# Patient Record
Sex: FEMALE | Race: WHITE | NOT HISPANIC OR LATINO | Employment: FULL TIME | URBAN - METROPOLITAN AREA
[De-identification: names, ages, dates, MRNs, and addresses within clinical notes are randomized per-mention and may not be internally consistent; named-entity substitution may affect disease eponyms.]

---

## 2019-10-28 ENCOUNTER — OFFICE VISIT (OUTPATIENT)
Dept: OBGYN CLINIC | Age: 39
End: 2019-10-28
Payer: COMMERCIAL

## 2019-10-28 VITALS
DIASTOLIC BLOOD PRESSURE: 74 MMHG | SYSTOLIC BLOOD PRESSURE: 122 MMHG | HEIGHT: 64 IN | BODY MASS INDEX: 29.71 KG/M2 | HEART RATE: 72 BPM | WEIGHT: 174 LBS

## 2019-10-28 DIAGNOSIS — N80.9 ENDOMETRIOSIS: ICD-10-CM

## 2019-10-28 DIAGNOSIS — Z30.011 ENCOUNTER FOR INITIAL PRESCRIPTION OF CONTRACEPTIVE PILLS: ICD-10-CM

## 2019-10-28 DIAGNOSIS — Z01.419 ROUTINE GYNECOLOGICAL EXAMINATION: Primary | ICD-10-CM

## 2019-10-28 DIAGNOSIS — Z87.42 HISTORY OF CERVICAL INCOMPETENCE: ICD-10-CM

## 2019-10-28 DIAGNOSIS — A60.9 HSV (HERPES SIMPLEX VIRUS) ANOGENITAL INFECTION: ICD-10-CM

## 2019-10-28 PROCEDURE — S0610 ANNUAL GYNECOLOGICAL EXAMINA: HCPCS | Performed by: STUDENT IN AN ORGANIZED HEALTH CARE EDUCATION/TRAINING PROGRAM

## 2019-10-28 RX ORDER — VALACYCLOVIR HYDROCHLORIDE 500 MG/1
500 TABLET, FILM COATED ORAL 2 TIMES DAILY
Qty: 24 TABLET | Refills: 4 | Status: SHIPPED | OUTPATIENT
Start: 2019-10-28 | End: 2020-11-03 | Stop reason: SDUPTHER

## 2019-10-28 RX ORDER — VALACYCLOVIR HYDROCHLORIDE 500 MG/1
500 TABLET, FILM COATED ORAL 2 TIMES DAILY
COMMUNITY
End: 2019-10-28 | Stop reason: SDUPTHER

## 2019-10-28 RX ORDER — DESOGESTREL AND ETHINYL ESTRADIOL 0.15-0.03
1 KIT ORAL DAILY
Qty: 84 TABLET | Refills: 3 | Status: SHIPPED | OUTPATIENT
Start: 2019-10-28 | End: 2020-10-09 | Stop reason: SDUPTHER

## 2019-10-28 NOTE — PROGRESS NOTES
Assessment/Plan:    Jeff Junior was seen today for gynecologic exam     Diagnoses and all orders for this visit:    Routine gynecological examination    Encounter for initial prescription of contraceptive pills  -     desogestrel-ethinyl estradiol (APRI) 0 15-30 MG-MCG per tablet; Take 1 tablet by mouth daily    Endometriosis  -     desogestrel-ethinyl estradiol (APRI) 0 15-30 MG-MCG per tablet; Take 1 tablet by mouth daily    HSV (herpes simplex virus) anogenital infection  -     valACYclovir (VALTREX) 500 mg tablet; Take 1 tablet (500 mg total) by mouth 2 (two) times a day for 3 days With concern for outbreak  Repeat as necessary    History of cervical incompetence    Normal annual exam  Likely flare of endometriosis postpartum and need for contraception  - Apri prescribed, discussed safe and effective use of medication, to start on Sunday after menses  - with genital HSV, treated effectively with valtrex when prodrome noted  - Pap up to date, due 3/2023  - We also discussed her obstetrical course, including shortened cervix with progesterone and bedrest  Discussed MFM presence at [facility], that we would montior cervical length closely in next pregnancy and that, should she become pregnant, we would recommend establishing care early    RTO one year for yearly exam or sooner as needed  Subjective:      Patient ID: Felicia Barriga is a 42 y o  female  S:  42 y o  female here for yearly exam  Doing well overall  Has moved to this area from [place] to be with her partner, who lives here to be near his two daughters  She is a  by [employer], but has not worked since being placed on bedrest for her recent pregnancy  Since delivery in February, she has been bothered by menstrual cramping that precedes menses by appx one week, as well as pain associated with ovulation  She did not breastfeed, so menses returned soon after delivery       She notes a history of endometriosis, diagnosed for similar symptoms  Both her mother and sister have extensive endometriosis requiring surgical intervention  She has been on desogestrel/EE since the age of 15 in order to control her endometriosis, which has been overall effective  She discontinued her OCP a few months prior to conception  Sexual activity: She is sexually active and notes deep dyspareunia, but not as bad as in the past  She denies bleeding or dryness with intercourse  Contraception: She uses condoms  for contraception  STD testing:  She does not want STD testing today  She has a history of genital herpes 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  Gardasil:  She has not had the Gardasil series  Last Pap 3/2018 NILM/HPV - on record review  She denies history of abnormal Pap, but does note HPV that was diagnosed in evaluation of a vulvar lesion  Never had HPV on a pap  Last Mammo: n/a    We reviewed ASCCP guidelines for Pap testing today  She is due for Pap in 3/2023    Family hx of breast/ovarian/colon cancer: none    The following portions of the patient's history were reviewed and updated as appropriate: allergies, current medications, past family history, past medical history, past social history, past surgical history and problem list     Review of Systems   Respiratory: Negative  Cardiovascular: Negative  Gastrointestinal: Negative for constipation and diarrhea  She denies dyschezia, melena/hematochezia  Genitourinary: Negative for difficulty urinating, pain with urination, vaginal bleeding, vaginal discharge, itching or odor  O:  Blood pressure 122/74, pulse 72, height 5' 4" (1 626 m), weight 78 9 kg (174 lb), last menstrual period 10/11/2019  Patient appears well and is not in distress  Neck is supple without masses  Breasts are symmetrical without mass, tenderness, nipple discharge, skin changes or adenopathy  Abdomen is soft and nontender without masses  External genitals are normal without lesions or rashes    Urethral meatus and urethra are normal  Bladder is normal to palpation  Vagina is normal without discharge or bleeding  Cervix is normal without discharge or lesion  Uterus is normal, mobile, nontender without palpable mass  No scarring palpated  Adnexa are normal, nontender, without palpable mass

## 2019-10-28 NOTE — PROGRESS NOTES
Luis Cokerlo presents today to establish care and for annual exam  She moved her from [place]  Had her son February of this year vaginal delivery  She was on bed rest from 22wk on for cervical shortening  She states she still feel off and has never back back to her pre-pregnancy norm  Discomfort between cycles and with lifting  She brought records with her from previous OB/GYN care-will be scanned into her chart

## 2020-10-09 DIAGNOSIS — N80.9 ENDOMETRIOSIS: ICD-10-CM

## 2020-10-09 DIAGNOSIS — Z30.011 ENCOUNTER FOR INITIAL PRESCRIPTION OF CONTRACEPTIVE PILLS: ICD-10-CM

## 2020-10-09 RX ORDER — DESOGESTREL AND ETHINYL ESTRADIOL 0.15-0.03
1 KIT ORAL DAILY
Qty: 84 TABLET | Refills: 0 | Status: SHIPPED | OUTPATIENT
Start: 2020-10-09 | End: 2022-03-18

## 2020-11-03 ENCOUNTER — OFFICE VISIT (OUTPATIENT)
Dept: OBGYN CLINIC | Age: 40
End: 2020-11-03
Payer: COMMERCIAL

## 2020-11-03 VITALS
HEIGHT: 64 IN | SYSTOLIC BLOOD PRESSURE: 122 MMHG | BODY MASS INDEX: 29.33 KG/M2 | WEIGHT: 171.8 LBS | DIASTOLIC BLOOD PRESSURE: 80 MMHG | TEMPERATURE: 97.7 F

## 2020-11-03 DIAGNOSIS — A60.9 HSV (HERPES SIMPLEX VIRUS) ANOGENITAL INFECTION: ICD-10-CM

## 2020-11-03 DIAGNOSIS — Z30.41 ENCOUNTER FOR SURVEILLANCE OF CONTRACEPTIVE PILLS: Primary | ICD-10-CM

## 2020-11-03 PROCEDURE — 99395 PREV VISIT EST AGE 18-39: CPT | Performed by: STUDENT IN AN ORGANIZED HEALTH CARE EDUCATION/TRAINING PROGRAM

## 2020-11-03 RX ORDER — VALACYCLOVIR HYDROCHLORIDE 500 MG/1
500 TABLET, FILM COATED ORAL 2 TIMES DAILY
Qty: 24 TABLET | Refills: 4 | Status: SHIPPED | OUTPATIENT
Start: 2020-11-03 | End: 2022-04-19

## 2022-03-17 NOTE — PROGRESS NOTES
Subjective      Dewayne Bill is a 46 y o  female who presents for evaluation of infertility  Patient and partner have been attempting conception for {10:58235} {time; units:97511}  Marital status: {marital status infertility:82307}  Pregnancies with current partner: {yes/no:464047}  Menstrual and Endocrine History  LMP No LMP recorded  (Menstrual status: Birth Control)     Menarche {918:47451}   Shortest interval {22-35:04639}   Longest interval {22-35:12364}  days   Duration of flow {0-10:64055} days   Heavy menses {yes/no:048284}   Clots {yes/no:709715}   Intermenstrual bleeding {yes/no:225598}   Postcoital bleeding {yes/no:726836}   Dysmenorrhea {yes/no:669186}   Amenorrhea {response; yes:49486}   Weight change {infertility wt marin}   Hirsutism {yes/no:166325}   Balding {yes/no:707142}   Acne {yes/no:257474}   Galactorrhea {yes/no:619798}     Obstetrical History  {infertility ob hx:85425}    Gynecologic History  Last PAP ***   Previous abdominal or pelvic surgery {yes/no:359954}   Pelvic pain {yes/no:517213}   Endometriosis {yes/no:773605}   Hot flashes {yes/no:639339}   ZUHAIR exposure {yes/no/unknown:74::"no"}   Abnormal Pap {yes/no:734044}   Cervix Cryo/cone {yes/no:821372}   Sexually transmitted diseases {yes/no:917409}   Pelvic inflammatory disease {yes/no/unknown:74::"no"}       Sexual History  Frequency {numbers:43349} times per {time; units:19646}   Satisfied {yes/no:370445}   Dyspareunia {yes deep:61821}   Use of lubricant {yes/no:558882}   Douching {yes/no:429563}   Number of lifetime sex partners {1-20:05235}     Contraception  {infertility contracep types:88850}    Family History  Thyroid problems  {yes/no/unknown:74::"no"}   Heart condition or high blood pressure  {yes/no/unknown:74::"no"}   Blood clot or stroke  {yes/no/unknown:74::"no"}   Diabetes  {yes/no/unknown:74::"no"}   Cancer  {yes/no/unknown:74::"no"}   Birth defects/inherited diseases  {yes/no/unknown:74::"no"}   Infectious diseases (mumps, TB, rubella)  {yes/no/unknown:74::"no"}   Other medical problems  {yes/no/unknown:74::"no"}     Habits  Cigarettes: Wife -  {response; smoking yes/no:72181}      - {response; smoking yes/no:14499}  Alcohol:     Wife -  {response; alcohol yes/no:96298}      - {response; alcohol yes/no:01528}  Marijuana: Wife - {yes/no/unknown:74::"no"}     - {yes/no/unknown:74::"no"}    {Common ambulatory SmartLinks:02651}    Review of Systems  {ros; complete:72590}   Male History and Exam  Name:  ***      Age: ***  Education: ***    Marital History:  {marital status:62}  # of years with this partner: {numbers (fuzzy):24491}  # of partners: {numbers (fuzzy):51628}    Paternity of Pregnancies:  Number with this partner: {numbers (fuzzy):47310}  Number with other partners: {numbers (fuzzy):67679}  Age of youngest child: {age 1-20/na:73722}    Urologic History:  Infection {yes/no/unknown:74::"no"}   STD {yes/no/unknown:74::"no"}   Mumps {yes/no/unknown:74::"no"}   Varicocele {yes/no/unknown:74::"no"}   Semen analysis {yes/no/unknown:74::"no"}   Undescended testes {yes/no/unknown:74::"no"}   Testicular trauma {yes/no/unknown:74::"no"}   Genital surgery {yes/no/unknown:74::"no"}   Ejaculatory problem {yes/no/unknown:74::"no"}   Impotence {yes/no/unknown:74::"no"}                Objective     Female Exam  There were no vitals taken for this visit  Wt Readings from Last 1 Encounters:   11/03/20 77 9 kg (171 lb 12 8 oz)     BMI: There is no height or weight on file to calculate BMI  {exam; complete:15929}    Male Exam  {inf male exam:32388}     Assessment     {repro-endo rfv:99086} due to {infertility:45343}       Plan     {plan; infertility:20376}

## 2022-03-18 ENCOUNTER — OFFICE VISIT (OUTPATIENT)
Dept: OBGYN CLINIC | Facility: MEDICAL CENTER | Age: 42
End: 2022-03-18
Payer: COMMERCIAL

## 2022-03-18 VITALS
WEIGHT: 161 LBS | DIASTOLIC BLOOD PRESSURE: 80 MMHG | BODY MASS INDEX: 27.49 KG/M2 | SYSTOLIC BLOOD PRESSURE: 118 MMHG | HEIGHT: 64 IN

## 2022-03-18 DIAGNOSIS — Z87.42 HISTORY OF CERVICAL INCOMPETENCE: ICD-10-CM

## 2022-03-18 DIAGNOSIS — N94.6 DYSMENORRHEA: ICD-10-CM

## 2022-03-18 DIAGNOSIS — Z31.69 INFERTILITY COUNSELING: Primary | ICD-10-CM

## 2022-03-18 PROCEDURE — 99204 OFFICE O/P NEW MOD 45 MIN: CPT | Performed by: CLINICAL NURSE SPECIALIST

## 2022-03-18 NOTE — ASSESSMENT & PLAN NOTE
Patient has not started trying yet  Discussed the following:  · Increased risk for chromosome disorders the older a women gets due to decreased ovarian reserve- will check AMH as well as day 3 and day 21 labs  · Reviewed available  screening and diagnostic testing available for birth defects  · For women > 40 there is an increased risk for medical complications such as GDM and Pre-eclampsia as well as Fetal growth restriction  · Discussed cycle monitoring and times sex during fertile windows    · Notify us if no pregnancy in 3-6 months as I would then refer to infertility specialist/     Also reviewed that infertility factors include:  · Male factor 26%  · Female factors 46%  · Unexplained- 28%

## 2022-03-18 NOTE — PROGRESS NOTES
Assessment/Plan:       1  Infertility counseling  Assessment & Plan:  Patient has not started trying yet  Discussed the following:  · Increased risk for chromosome disorders the older a women gets due to decreased ovarian reserve- will check AMH as well as day 3 and day 21 labs  · Reviewed available  screening and diagnostic testing available for birth defects  · For women > 40 there is an increased risk for medical complications such as GDM and Pre-eclampsia as well as Fetal growth restriction  · Discussed cycle monitoring and times sex during fertile windows  · Notify us if no pregnancy in 3-6 months as I would then refer to infertility specialist/     Also reviewed that infertility factors include:  · Male factor 26%  · Female factors 46%  · Unexplained- 28%      Orders:  -     Ambulatory Referral to Maternal Fetal Medicine; Future; Expected date:   -     Follicle stimulating hormone; Future  -     Estradiol; Future  -     Antimullerian hormone (AMH); Future  -     TSH, 3rd generation with Free T4 reflex; Future    2  History of cervical incompetence  Assessment & Plan:  Hx of short cervix  On bedrest and vaginal progesterone at 22 wks  Delivered at term  There is an increased incidence of recurrence, but reassuring that she did not have PTL/PTL  Reviewed increased surveillance for cervical shortening every 2-4 wk from 16-24 wks in future pregnancies  MFM preconception consult ordered  Orders:  -     Ambulatory Referral to Maternal Fetal Medicine; Future; Expected date: 2022    3  Dysmenorrhea  Assessment & Plan:  C/O increased cramping  Since desires pregnancy will check US for anatomic changes    Orders:  -     US pelvis complete w transvaginal; Future; Expected date: 2022          Subjective:      Patient ID: Meryl Kapoor is a 46 y o  female  She is here for Fertility consult    HPI     Patient is considering another pregnancy    She is about to turn 42 in a few days and wanted to know what risks  are applicable due to age  She is a    OB History    Para Term  AB Living   1 1 1     1   SAB IAB Ectopic Multiple Live Births                  # Outcome Date GA Lbr Paul/2nd Weight Sex Delivery Anes PTL Lv   1 Term 19 38w0d  3175 g (7 lb) M Vag-Vacuum None N       Birth Comments: possible abruption based on description rapid labor, heavy bleeding with clotting problems (?DIC) and "placenta" problem      Complications: Abruptio Placenta, Cervical shortening       Only one prior pregnancy and was complicated by cervical shortening found at level 2 US, as well as clinical abruption at 38 wks  Records in chart from previous pregnancy    She has stopped her contraception several months ago, but they are not actively trying    Menstrual History:  Patient's last menstrual period was 2022 (exact date)  Period Cycle (Days):  (28-30)  Period Duration (Days): 4-7  Period Pattern: Regular  Menstrual Flow: Moderate  Menstrual Control Change Freq (Hours): q 4  Dysmenorrhea: (!) Moderate  Dysmenorrhea Symptoms: Cramping (does get ovulatory cramping, and some cramping premenstrually)      No hx of thyroid dx  The following portions of the patient's history were reviewed and updated as appropriate: allergies, current medications, past family history, past medical history, past social history, past surgical history, and problem list     Review of Systems  See HPI for pertinent positives          Objective:    /80 (BP Location: Right arm, Patient Position: Sitting, Cuff Size: Adult)   Ht 5' 4" (1 626 m)   Wt 73 kg (161 lb)   LMP 2022 (Exact Date)   BMI 27 64 kg/m²      Physical Exam  Constitutional:       General: She is not in acute distress  Appearance: Normal appearance  Genitourinary:      Genitourinary Comments: Pelvic exam deferred     Cardiovascular:      Rate and Rhythm: Normal rate     Pulmonary:      Effort: Pulmonary effort is normal    Abdominal:      General: There is no distension  Palpations: Abdomen is soft  Musculoskeletal:         General: Normal range of motion  Neurological:      Mental Status: She is alert and oriented to person, place, and time  Skin:     General: Skin is warm and dry     Psychiatric:         Mood and Affect: Mood normal          Behavior: Behavior normal

## 2022-03-18 NOTE — PATIENT INSTRUCTIONS
· Do bloodwork on day 3 of menstrual cycle  · Get US done     · Call Contact information for [facility] (AKA Maternal Fetal Medicine)  · Main Number [phone]  To schedule pre-conception consult

## 2022-03-18 NOTE — ASSESSMENT & PLAN NOTE
Hx of short cervix  On bedrest and vaginal progesterone at 22 wks  Delivered at term  There is an increased incidence of recurrence, but reassuring that she did not have PTL/PTL  Reviewed increased surveillance for cervical shortening every 2-4 wk from 16-24 wks in future pregnancies  M preconception consult ordered

## 2022-03-28 ENCOUNTER — LAB (OUTPATIENT)
Dept: LAB | Facility: CLINIC | Age: 42
End: 2022-03-28
Payer: COMMERCIAL

## 2022-03-28 DIAGNOSIS — Z31.69 INFERTILITY COUNSELING: ICD-10-CM

## 2022-03-28 LAB
ESTRADIOL SERPL-MCNC: 99 PG/ML
FSH SERPL-ACNC: 9 MIU/ML
TSH SERPL DL<=0.05 MIU/L-ACNC: 1.29 UIU/ML (ref 0.36–3.74)

## 2022-03-28 PROCEDURE — 82397 CHEMILUMINESCENT ASSAY: CPT

## 2022-03-28 PROCEDURE — 36415 COLL VENOUS BLD VENIPUNCTURE: CPT

## 2022-03-28 PROCEDURE — 83001 ASSAY OF GONADOTROPIN (FSH): CPT

## 2022-03-28 PROCEDURE — 82670 ASSAY OF TOTAL ESTRADIOL: CPT

## 2022-03-28 PROCEDURE — 84443 ASSAY THYROID STIM HORMONE: CPT

## 2022-04-04 ENCOUNTER — TELEPHONE (OUTPATIENT)
Dept: OBGYN CLINIC | Facility: CLINIC | Age: 42
End: 2022-04-04

## 2022-04-04 LAB — MIS SERPL-MCNC: 0.36 NG/ML

## 2022-04-04 NOTE — TELEPHONE ENCOUNTER
----- Message from Azul Arriaga Nahomi  sent at 4/4/2022  8:41 AM EDT -----  AMH is mildly decreased  Would recommend infertility consult  Please provide #'s for [facility] and [facility]  Can go to either  If formal referral is needed, can provide, but not usually needed

## 2022-04-04 NOTE — RESULT ENCOUNTER NOTE
AMH is mildly decreased  Would recommend infertility consult  Please provide #'s for [facility] and [facility]  Can go to either  If formal referral is needed, can provide, but not usually needed

## 2022-04-15 ENCOUNTER — HOSPITAL ENCOUNTER (OUTPATIENT)
Dept: RADIOLOGY | Facility: MEDICAL CENTER | Age: 42
Discharge: HOME | End: 2022-04-15
Payer: COMMERCIAL

## 2022-04-15 DIAGNOSIS — N94.6 DYSMENORRHEA: ICD-10-CM

## 2022-04-15 PROCEDURE — 76856 US EXAM PELVIC COMPLETE: CPT

## 2022-04-15 PROCEDURE — 76830 TRANSVAGINAL US NON-OB: CPT

## 2022-04-22 PROBLEM — O35.2XX0 HEREDITARY DISEASE IN FAMILY POSSIBLY AFFECTING FETUS: Status: ACTIVE | Noted: 2022-04-22

## 2022-04-22 PROBLEM — N88.3 SHORT CERVIX: Status: ACTIVE | Noted: 2019-10-28

## 2022-04-22 PROBLEM — Z87.59 HISTORY OF POOR PREGNANCY OUTCOME: Status: ACTIVE | Noted: 2022-04-22

## 2022-04-22 PROBLEM — Z91.89 AT RISK FOR ANEUPLOIDY: Status: ACTIVE | Noted: 2022-04-22

## 2022-04-25 ENCOUNTER — TELEPHONE (OUTPATIENT)
Dept: PERINATAL CARE | Facility: OTHER | Age: 42
End: 2022-04-25

## 2022-04-25 NOTE — TELEPHONE ENCOUNTER
Tried to call patient multiple times to schedule preconception consultation, and was unable to contact  Left several messages  I will sent a [employer] message to her OB

## 2024-03-22 ENCOUNTER — TELEPHONE (OUTPATIENT)
Age: 44
End: 2024-03-22

## 2024-03-22 ENCOUNTER — INITIAL PRENATAL (OUTPATIENT)
Dept: OBGYN CLINIC | Facility: CLINIC | Age: 44
End: 2024-03-22
Payer: COMMERCIAL

## 2024-03-22 VITALS — WEIGHT: 167.4 LBS | SYSTOLIC BLOOD PRESSURE: 134 MMHG | BODY MASS INDEX: 28.73 KG/M2 | DIASTOLIC BLOOD PRESSURE: 82 MMHG

## 2024-03-22 DIAGNOSIS — Z3A.09 9 WEEKS GESTATION OF PREGNANCY: Primary | ICD-10-CM

## 2024-03-22 DIAGNOSIS — N91.1 SECONDARY AMENORRHEA: ICD-10-CM

## 2024-03-22 DIAGNOSIS — Z36.89 ENCOUNTER TO ESTABLISH GESTATIONAL AGE USING ULTRASOUND: ICD-10-CM

## 2024-03-22 DIAGNOSIS — O09.521 MULTIGRAVIDA OF ADVANCED MATERNAL AGE IN FIRST TRIMESTER: ICD-10-CM

## 2024-03-22 DIAGNOSIS — B00.9 HSV (HERPES SIMPLEX VIRUS) INFECTION: ICD-10-CM

## 2024-03-22 PROCEDURE — 76817 TRANSVAGINAL US OBSTETRIC: CPT

## 2024-03-22 PROCEDURE — 99213 OFFICE O/P EST LOW 20 MIN: CPT

## 2024-03-22 RX ORDER — VALACYCLOVIR HYDROCHLORIDE 500 MG/1
500 TABLET, FILM COATED ORAL 2 TIMES DAILY
Qty: 6 TABLET | Refills: 3 | Status: SHIPPED | OUTPATIENT
Start: 2024-03-22 | End: 2024-03-25

## 2024-03-22 RX ORDER — VALACYCLOVIR HYDROCHLORIDE 500 MG/1
500 TABLET, FILM COATED ORAL AS NEEDED
COMMUNITY
End: 2024-03-22 | Stop reason: SDUPTHER

## 2024-03-22 NOTE — PROGRESS NOTES
"Subjective  Patient ID Jolie Garcia is a 44 y.o. female here for   No chief complaint on file.    Newly Pregnant  Patient's last menstrual period was 01/15/2024.. giving her an NELLY of 10/21/24 and a gestational age of  9w4d (based on LMP). Unplanned but still welcoming. Report nausea, fatigue and headaches. She was on a keto intensive diet and has been having frequent headaches since adding carbs back into her diet.       Menstrual cycle: regular, cycle length:  24-32 days   Pregnancy was unplanned, but is welcome  She has started taking a prenatal vitamin     She is C/O amenorrhea  Signs and symptoms of pregnancy:   Breast tenderness: Yes   Fatigue: Yes   Cramping or Pelvic Pain: Yes - mild cramps   Spotting or Vaginal Bleeding: No  Nausea or vomiting: Yes     OB History    Para Term  AB Living   2 1 1     1   SAB IAB Ectopic Multiple Live Births                  # Outcome Date GA Lbr Paul/2nd Weight Sex Delivery Anes PTL Lv   2 Current            1 Term 19 38w0d  3175 g (7 lb) M Vag-Vacuum None N       Birth Comments: possible abruption based on description rapid labor, heavy bleeding with clotting problems (?DIC) and \"placenta\" problem      Complications: Abruptio Placenta, Cervical shortening       The following portions of the patient's history were reviewed and updated as appropriate: allergies, current medications, past family history, past medical history, past social history, past surgical history, and problem list.     Perinent hx that may affect pregnancy:  HSV  AMA  Endometriosis  Placenta abruption/VAVD  Short cervix      Review of Systems     See HPI for pertinent positives.    Vitals:    24 1111   BP: 134/82       OBGyn Exam      FIRST TRIMESTER OBSTETRIC ULTRASOUND     Patient's last menstrual period was 01/15/2024.      INDICATION: Establish Gestational Age      See OB procedures for report     Assigning a Final NELLY  Please choose how you are assigning the NELLY: The " gestational age by LMP is 9w 0d - 13w 6d and demonstrates 7 or fewer days difference from the gestational age by CRL, therefore the final NELLY will be based on the LMP    Final NELLY: 10/21/24 by LMP.        SUBHASH Vieira  [facility] [place]  [address] [facility] [address]  Dept: [phone]  Dept Fax: [phone]  Loc Appt: [phone]  Loc: [phone]  Loc Fax: [phone]    Ultrasound Probe Disinfection    A transvaginal ultrasound was performed.   Prior to use, disinfection was performed with High Level Disinfection Process (BlockSpring).  Probe serial number [id number]: [id number] was used.                      Assessment/Plan    Diagnoses and all orders for this visit:    9 weeks gestation of pregnancy  -     Ambulatory Referral to Maternal Fetal Medicine; Future    HSV (herpes simplex virus) infection  -     valACYclovir (VALTREX) 500 mg tablet; Take 1 tablet (500 mg total) by mouth 2 (two) times a day for 3 days    Secondary amenorrhea  -     AMB US OB < 14 weeks single or first gestation level 1    Encounter to establish gestational age using ultrasound  -     AMB US OB < 14 weeks single or first gestation level 1    Multigravida of advanced maternal age in first trimester  -     Ambulatory Referral to Maternal Fetal Medicine; Future        9 weeks gestation of pregnancy  She is a  with Patient's last menstrual period was 01/15/2024. Ultrasound today is showing a viable IUP of 9w 5d c/w GA by LMP.  EDC - 10/21/24 .  Referral to [facility] given for routine US. F/U for OB intake and then Initial Prenatal Exam.  Recommend she try vitamin B6 and Unisom for n/v.     Orders Placed This Encounter   Procedures    Ambulatory Referral to Maternal Fetal Medicine     Standing Status:   Future     Standing Expiration Date:   3/22/2025     Referral Priority:   Routine     Referral Type:   Consult - AMB     Referral Reason:   Specialty Services  Required     Requested Specialty:   Maternal and Fetal Medicine     Number of Visits Requested:   1     Expiration Date:   3/22/2025    AMB US OB < 14 weeks single or first gestation level 1     This back office order was created through the Ultrasound Visit Navigator section.

## 2024-03-22 NOTE — ASSESSMENT & PLAN NOTE
She is a  with Patient's last menstrual period was 01/15/2024. Ultrasound today is showing a viable IUP of 9w 5d c/w GA by LMP.  EDC - 10/21/24 .  Referral to [facility] given for routine US. F/U for OB intake and then Initial Prenatal Exam.  Recommend she try vitamin B6 and Unisom for n/v.

## 2024-03-22 NOTE — PATIENT INSTRUCTIONS
"Again, congratulations on your pregnancy!  NEXT STEPS  Next appt is for your OB intake with one of our Nurses, then about 2 wks after that we will see you for your first prenatal exam  Call [facility] to schedule next ultrasound (done 12-13 wks)   Contact information for [facility] (AKA Maternal Fetal Medicine)- Main Number [phone]   Check out [facility] website to read the \"Pregnancy Essentials Guide\"      It can be found by going to [employer]-->select services-->select women's health-->select Obstetrics  [email or web address]    Below is a variety of information that is useful in early pregnancy   Genetic screening can be very confusing for most people   We do recommend genetic screening universally for all pregnant women. Our goal is to have the most healthy uncomplicated pregnancy possible and this is one way to identify possible complications early.  Common disorders we can screen for include: Down Syndrome, Neural tube defects ( like spina bifida or gastroschisis) and trisomy 13, even possibly more  There are several options we will talk more about. Below is some information to get you started thinking about this.    You can follow the link to watch a short video about genetic screening/testing   [facility]    GUIDE TO GENETIC TESTING    Aneuploidy Testing  Trisomy 21 (Down Syndrome), Trisomy 18, and Open Neural Tube Defects (Spina Bifida).  You may choose one of the following options: See below for CPT/Diagnostic codes  NIPT (Non-Invasive Prenatal Testing or Cell Free- Fetal DNA): This simple and accurate non-invasive prenatal screening blood test offers results for early risk assessment of Down syndrome (Trisomy 21), or Trisomy 18, trisomy 13 and other aneuploidy conditions.  The test also offers an optional analysis for fetal sex.  The test analyzes the relative amount of 21, 18, 13; X and Y chromosome material in circulating cell-free DNA " from a maternal blood sample.  This test can be performed at any time after 10 weeks gestation.  If you elect this test, you will also have an AFP (alpha-fetoprotein) blood test to test for open neural tube defects.  Recommended follow up to a positive result is genetic counseling and prenatal diagnosis.     Sequential Screening with Nuchal Translucency: This is a two-step test to detect whether a fetus is at increased risk for trisomy 21, trisomy 18, trisomy 13 and open neural tube defects.  The test has a narrow window for testing (the first step must be performed between 10 and 13 weeks gestation).  It includes two blood draws and an ultrasound.  The ultrasound measures the amount of fluid behind the baby’s neck called the nuchal translucency (NT).  The blood tests measures three different maternal hormone levels, -- pregnancy associated plasma protein (CHARLES-A), human chorionic gonadotrophin (hCG), and dimeric inhibin A (MERYL).  These measurements in combination with some maternal information such as height and weight are used to calculate whether the baby is at increased risk for Down Syndrome or Trisomy 18.  An alpha-fetoprotein test (AFP) is also included to test for open neural tube defects.  Recommended follow up to a positive result is additional testing that is more definitive, and referral for genetic counseling and prenatal diagnosis.    Quad Screen: This test is also known as the quadruple marker test.  It is a test that measures levels of four substances in a pregnant woman’s blood--Alpha-fetoprotein (AFP), a protein made by the developing baby; Human chorionic gonadotropin (hCG), a hormone made by the placenta; Estriol, a hormone made by the placenta and the baby’s liver; and Inhibin A, another hormone made by the placenta.  Typically, the quad screen is done between weeks 15 and 20 of pregnancy--the second trimester and the results indicate whether the baby is at higher risk for Down Syndrome (Trisomy  21), Trisomy 18, and open neural tube defects.  This is a screening test.  Recommended follow up to a positive result is additional testing that is more definitive, as well as referral for genetic counseling and prenatal diagnosis.        Trisomy 21 Trisomy 18 Trisomy 13   NIPT  (FPR 0.1%) <99% <98% 99%   Sequential Screening (FPR 3.5%) 92% 90% N/A   Quad Screen   (FPR 5%) 83% 80% N/A   (FPR is False Positive Rate)       Additional Screening Tests Offered  Cystic Fibrosis: Cystic Fibrosis is the most common inherited disease of children and young adults.  The carrier frequency is 1 in 24, to 1 in 97.  Both parents need to be carriers for a child to be affected (25% chance).  One in 3500, children born are affected.  Cystic fibrosis is a disorder of mucus production and produces abnormally thick mucus leading to life threatening lung infections, digestion problems, poor growth, infertility, and more.  Symptoms range from mild to severe, but individuals with severe disease may die in childhood.  With treatments today, people with Cystic Fibrosis can live into their 30’s.  CF does not affect intelligence.  Recommended follow up to a positive result is testing of the baby’s father.  Spinal Muscular Atrophy (SMA): SMA is the most common inherited cause of early childhood death.  The carrier frequency is 1 in 47 to 1 in 72 in the [place] and both parents need to be carriers for a child to be affected (25% chance).  1 in 11,000 children are affected.  SMA is a progressive degeneration of lower motor neurons.  Muscle weakness is the most common type with respiratory failure by the age of 2 years old.  Muscles responsible for crawling, walking, swallowing, and head and neck control are most severely affected.  Recommended follow up to a positive result is testing of the baby’s father.      Fragile X Syndrome (the most common inherited cause of developmental delays): Fragile X syndrome is an “X-linked” genetic disease, which means  it is only carried by the mom.  Unfortunately, 1 in 250 females are carriers and a child has a 50% chance of being affected if this is the case.  1 in 4000 boys is affected with Fragile X and 1 in 8000 girls is affected.  Approximately 1/3 of all children born with Fragile X also have autism and hyperactivity.  Recommended follow up to a positive result is genetic counseling and prenatal diagnosis.      Guide To Insurance Coverage For Genetic Screening  Due to the complexity of coverage guidelines, we are unable to quote benefits or guarantee insurance coverage for any of these tests.  Insurance benefits are plan-specific and offer vastly different coverage based on your policy.  The handout attached explains the benefits to each test, and also provides the billing (CPT) codes for each test.  Even if the testing is covered, it could be applied to any unmet deductibles, and copays may apply, resulting in a bill.  You are encouraged to contact your insurance company to obtain your benefits based on your age and risk factors, so that there are no surprises.      Test Insurance Procedure (CPT) code   NIPT-cell free fetal DNA Most accurate non-invasive test- not always covered w/o risk factors 78458   Sequential Screen w/ NT US Current standard test-should be covered Part 1: (if lab is [facility]) 40876,89471   (If lab is [facility]) 28599  Part 2: (if lab is [facility])81995,55300,42720, 07503  (If lab is [facility]) 24486   Quad screen Old standard-use if past 1st trimester 02561, 85891, 12906, 85229   CF- Cystic Fibrosis  56038   SMA- Spinal Musc. Atrophy  64281   Fragile X  51534       Diagnosis code used Varies based on history- But will be one of these:  Encounter for  screening for other genetic defect Z36.8  Advanced Maternal Age (over 35) O09.529  Family History of Genetic Disease Carrier Z84.81    Please contact your insurance company with the appropriate CPT code from the attached list, and diagnosis code applicable  to your situation.    We ask that you review this information and decide what testing you would like to have performed.  Please note that the Sequential Screening with Nuchal Translucency has a smaller window of time to be performed during pregnancy (Prior to 14 wks).        Warning Signs During Pregnancy  The list below includes warning signs your providers would like you to be aware of.  If you experience any of these at any time during your pregnancy, please call us as soon as possible.     Vaginal bleeding   Sharp abdominal pain that does not go away   Fever (more than 100.4?F and is not relieved with Tylenol)   Persistent vomiting lasting greater than 24 hours   Chest pain   Pain or burning when you urinate      Call the OFFICE  for any questions/emergencies.  At night or on the weekend, please indicate it is an emergency and the DOCTOR on call will be paged.    Discomforts of Early Pregnancy    Tips for coping with nausea and vomiting during pregnancy   Eat meals and snacks slowly   Eat every 1-2 hours to avoid a full stomach   Don’t skip meals, avoid empty stomach   Eat a snack prior to getting out of bed   Avoid food and beverages with a strong aroma   Avoid dehydration - drink enough fluid to keep the urine pale yellow   Drink fluids before a meal to minimize the effect of a full stomach   Limit the amount of coffee and beverages that contain caffeine   Eliminate spicy, odorous, high fat (fried foods), acidic (tomato products) and sweet foods   Fluids that contain lemon (lemonade), mint (tea) or orange can usually be well tolerated   Snacks and meals that contain low-fat protein (lean meats, fish, poultry and eggs) along with eating easily digestible carbs (fruit, rice, toast, crackers and dry cereal) may be tolerated better   Foods with ginger may be well tolerated. May use ginger root powder, capsules or extract (up to 1000 mg per day)   Drink liquids in small amounts    If symptoms persist, please  contact your provider.      Tips for coping with constipation during pregnancy   Increase fiber and fluids.  - Drink 8-10 cups of liquid, like water or low-sugar juice daily  - Keep urine pale with fluids (water, milk), fruit and vegetables   Eat a well-balanced diet that contains high fiber food (fruits, vegetables, whole grain breads and cereals, bran and dried beans)   Take a 30-minute walk daily   You may take a mild stool softener such as Colace®    If symptoms persist, please contact your provider.      For any emergencies, PLEASE CALL THE OFFICE.  If the office is closed, the doctor on call will be paged by the answering service.    Medications and Pregnancy- please see above- pregnancy essentials guide- or handout given today  What fish is safe to eat during pregnancy?- againe see pregnancy essentials guide    Expected Weight Gain During Pregnancy  If you have a healthy BMI (18-25) prior to pregnancy:  The recommended weight gain is between 25-35 pounds. Approximate weight gain  in the first trimester is 1-4.5 pounds. An expected weight gain during the second and  third trimester is approximately one pound per week.  If you have a BMI of less than 18 prior to pregnancy,  you are considered underweight:  The recommended weight gain is between 28-40 pounds. Approximate weight gain  in the first trimester is 1-4.5 pounds. An expected weight gain during the second and  third trimester is just over one pound per week.  If your BMI is 25 to 29.9: you are considered overweight:  You should gain 1/2 to 2/3 pound during the second and third trimester, for a total  weight gain of 15 to 25 pounds.  If you have a BMI of greater than 30 prior to pregnancy,  you are considered overweight:  The recommended weight gain is between 15-25 pounds. Approximate weight gain  in the first trimester is 1-4.5 pounds. An expected weight gain during the second  and third trimester is approximately 0.5 pound per week.    Foods to avoid  during pregnancy:   Unpasteurized milk, juice and cheese  - Soft cheeses like feta or brie (if made with UNPASTEURIZED milk)   Unheated deli meats like lunchmeat and hotdogs   Undercooked poultry, beef, pork, seafood including raw sushi      Exercise During Pregnancy  A daily exercise program that consists of 30 minutes a day is recommended.   Low impact exercises like walking and swimming are great exercises throughout  all of pregnancy   If you’re an avid strength  avoid lifting very heavy weights - nothing more  than 30 pounds    Drink plenty of fluids while exercising to stay hydrated.  Be careful to avoid overheating.    ACTIVITIES TO AVOID   Exercises that can make you lose your balance.   Activities that can put your baby at risk i.e. horseback riding, scuba diving, skiing  or snowboarding. Any other sport that puts you at risk for getting hit in the  abdominal area.   Do not use saunas, steam rooms or hot tubs (that have a higher temperature  than 100F)   After the first trimester, avoid exercises that require you to lay flat on your back.   Avoid exceeding a heart rate greater than 140 beats per minute. As long as you are  able to hold a conversation while exercising your heart rate is likely acceptable    Vaccines and Pregnancy    Information for pregnant women  Vaccines help protect you and your baby against serious diseases.  Whooping Cough Vaccine  Whooping cough (or pertussis) can be serious for anyone, but for your , it can be lifethreatening.  Up to 20 babies die each year in the [place] due to whooping cough.   When you get the whooping cough vaccine during your pregnancy, your body will create protective  antibodies and pass some of them to your baby before birth. These antibodies will provide your  baby some short-term, early protection against whooping cough.  Learn more at www.cdc.gov/pertussis/pregnant/.    Flu and COVID Vaccine  Changes in your immune, heart, and lung  functions during pregnancy make you more likely to get  seriously ill from the flu. Catching the flu also increases your chances for serious problems for your  developing baby, including premature labor and delivery. Get the flu shot if you are pregnant during  flu season--it’s the best way to protect yourself and your baby for several months after birth from flu-related  complications.  Flu seasons vary in their timing from season to season, but CDC recommends getting vaccinated  by the end of October, if possible. This timing helps protect you before flu activity begins to increase.  Find more on how to prevent the flu by visiting www.cdc.gov/flu/.  Covid vaccine is now recommend for pregnant women by ACOG and FM

## 2024-04-10 NOTE — PROGRESS NOTES
Please refer to the [facility] ultrasound report in Ob Procedures for additional information regarding today's visit

## 2024-04-11 ENCOUNTER — APPOINTMENT (OUTPATIENT)
Dept: LAB | Facility: HOSPITAL | Age: 44
End: 2024-04-11
Payer: COMMERCIAL

## 2024-04-11 ENCOUNTER — ROUTINE PRENATAL (OUTPATIENT)
Dept: PERINATAL CARE | Facility: OTHER | Age: 44
End: 2024-04-11
Payer: COMMERCIAL

## 2024-04-11 VITALS
HEIGHT: 64 IN | DIASTOLIC BLOOD PRESSURE: 54 MMHG | WEIGHT: 175.2 LBS | BODY MASS INDEX: 29.91 KG/M2 | HEART RATE: 75 BPM | SYSTOLIC BLOOD PRESSURE: 98 MMHG

## 2024-04-11 DIAGNOSIS — Z3A.12 12 WEEKS GESTATION OF PREGNANCY: ICD-10-CM

## 2024-04-11 DIAGNOSIS — O09.521 MULTIGRAVIDA OF ADVANCED MATERNAL AGE IN FIRST TRIMESTER: Primary | ICD-10-CM

## 2024-04-11 DIAGNOSIS — O09.521 SUPERVISION OF ELDERLY MULTIGRAVIDA IN FIRST TRIMESTER: ICD-10-CM

## 2024-04-11 DIAGNOSIS — O09.291 PRIOR PREGNANCY WITH PLACENTA ABRUPTION, ANTEPARTUM, FIRST TRIMESTER: ICD-10-CM

## 2024-04-11 DIAGNOSIS — O09.291 HISTORY OF PRIOR PREGNANCY WITH SHORT CERVIX, CURRENTLY PREGNANT, FIRST TRIMESTER: ICD-10-CM

## 2024-04-11 PROCEDURE — 76801 OB US < 14 WKS SINGLE FETUS: CPT | Performed by: OBSTETRICS & GYNECOLOGY

## 2024-04-11 PROCEDURE — 76813 OB US NUCHAL MEAS 1 GEST: CPT | Performed by: OBSTETRICS & GYNECOLOGY

## 2024-04-11 PROCEDURE — 36415 COLL VENOUS BLD VENIPUNCTURE: CPT

## 2024-04-11 RX ORDER — ASPIRIN 81 MG/1
162 TABLET, CHEWABLE ORAL
Qty: 180 TABLET | Refills: 1 | Status: SHIPPED | OUTPATIENT
Start: 2024-04-11 | End: 2024-07-10

## 2024-04-11 NOTE — LETTER
April 12, 2024     Sonia Bunn MD  [address]  [address] [place] [id number]    Patient: Jolie Garcia   YOB: 1980   Date of Visit: 4/11/2024       Dear Dr. Bunn:    Thank you for referring Jolie Garcia to me for evaluation. Below are my notes for this consultation.    If you have questions, please do not hesitate to call me. I look forward to following your patient along with you.         Sincerely,        Gaurang Castanon MD        CC: No Recipients    Gaurang Castanon MD  4/11/2024 10:58 AM  Sign when Signing Visit  Please refer to the [facility] ultrasound report in Ob Procedures for additional information regarding today's visit

## 2024-04-11 NOTE — PROGRESS NOTES
Patient chose to have [facility] [id number] Non-Invasive Prenatal Screen [id number] PLUS w/ SCA, WITH fetal sex.  Patient choose to be billed through insurance.     Patient given brochure and is aware [facility] will contact patient's insurance and coordinate coverage.  Provided [facility] contact information. General inquiries [phone], Cost estimates [phone] and [facility] Billing [phone]. Website [email or web address].     Blood collection tubes labeled with patient identifiers (name, medical record number, and date of birth).     Filled out [facility] order form. Patient chose to be sent to an outpatient lab to complete blood work. . After one failed attempt      If patient chose to have blood work drawn at a [facility] lab we requested patient notify MFM (via phone call or [employer] message) when blood collected so office can follow up on results.       Maternal Fetal Medicine will have results in approximately 5-7 business days and will call patient or notify via [employer].  Patient aware viewing lab result online will reveal fetal sex if ordered.    Patient verbalized understanding of all instructions and no questions at this time.

## 2024-04-12 PROBLEM — O09.291 PRIOR PREGNANCY WITH PLACENTA ABRUPTION, ANTEPARTUM, FIRST TRIMESTER: Status: ACTIVE | Noted: 2024-04-12

## 2024-04-12 PROBLEM — O09.291: Status: ACTIVE | Noted: 2024-04-12

## 2024-04-12 PROBLEM — O09.521 MULTIGRAVIDA OF ADVANCED MATERNAL AGE IN FIRST TRIMESTER: Status: ACTIVE | Noted: 2024-04-12

## 2024-04-15 NOTE — PATIENT INSTRUCTIONS
Congratulations!! Please review our Pregnancy Essential Guide and [facility] L&D Virtual tour from our networks website.     [facility] Pregnancy Essentials Guide  [facility] ([email or web address])     [facility] ([employer] A Boat)

## 2024-04-16 ENCOUNTER — PATIENT MESSAGE (OUTPATIENT)
Dept: OBGYN CLINIC | Facility: CLINIC | Age: 44
End: 2024-04-16

## 2024-04-16 ENCOUNTER — INITIAL PRENATAL (OUTPATIENT)
Dept: OBGYN CLINIC | Facility: CLINIC | Age: 44
End: 2024-04-16
Payer: COMMERCIAL

## 2024-04-16 VITALS — WEIGHT: 175 LBS | BODY MASS INDEX: 29.88 KG/M2 | HEIGHT: 64 IN

## 2024-04-16 DIAGNOSIS — Z34.81 PRENATAL CARE, SUBSEQUENT PREGNANCY, FIRST TRIMESTER: Primary | ICD-10-CM

## 2024-04-16 DIAGNOSIS — Z31.430 ENCOUNTER OF FEMALE FOR TESTING FOR GENETIC DISEASE CARRIER STATUS FOR PROCREATIVE MANAGEMENT: ICD-10-CM

## 2024-04-16 DIAGNOSIS — Z36.9 ENCOUNTER FOR ANTENATAL SCREENING OF MOTHER: ICD-10-CM

## 2024-04-16 LAB
CFDNA.FET/CFDNA.TOTAL SFR FETUS: NORMAL %
CITATION REF LAB TEST: NORMAL
FET 13+18+21+X+Y ANEUP PLAS.CFDNA: NEGATIVE
FET CHR 21 TS PLAS.CFDNA QL: NEGATIVE
FET CHR 21 TS PLAS.CFDNA QL: NEGATIVE
FET MS X RISK WBC.DNA+CFDNA QL: NOT DETECTED
FET SEX PLAS.CFDNA DOSAGE CFDNA: NORMAL
FET TS 13 RISK PLAS.CFDNA QL: NEGATIVE
FET X + Y ANEUP RISK PLAS.CFDNA SEQ-IMP: NOT DETECTED
GA EST FROM CONCEPTION DATE: NORMAL D
GESTATIONAL AGE > 9:: YES
LAB DIRECTOR NAME PROVIDER: NORMAL
LAB DIRECTOR NAME PROVIDER: NORMAL
LABORATORY COMMENT REPORT: NORMAL
LIMITATIONS OF THE TEST: NORMAL
NEGATIVE PREDICTIVE VALUE: NORMAL
PERFORMANCE CHARACTERISTICS: NORMAL
POSITIVE PREDICTIVE VALUE: NORMAL
REF LAB TEST METHOD: NORMAL
SL AMB NOTE:: NORMAL
TEST PERFORMANCE INFO SPEC: NORMAL

## 2024-04-16 PROCEDURE — T1001 NURSING ASSESSMENT/EVALUATN: HCPCS

## 2024-04-16 NOTE — PROGRESS NOTES
OB INTAKE INTERVIEW  Patient is 44 y.o.y.o. who presents for OB intake at 13wks  She is accompanied by herself during this encounter  The father of her baby (Gm Maria) is involved in the pregnancy and is 49 years old.      Last Menstrual Period: 1/15/24  Ultrasound: Measured 9 weeks 5 days on 3/22  Estimated Date of Delivery: 10/21/24 confirmed by 9 week US    Signs/Symptoms of Pregnancy  Current pregnancy symptoms: food aversions, nausea (resolving)  Constipation YES  Headaches YES  Cramping/spotting no  PICA cravings no    Diabetes-  Body mass index is 30.04 kg/m².  If patient has 1 or more, please order early 1 hour GTT  History of GDM no  BMI >35 no  History of PCOS or current metformin use no  History of LGA/macrosomic infant (4000g/9lbs) no    If patient has 2 or more, please order early 1 hour GTT  BMI>30 YES  AMA YES  First degree relative with type 2 diabetes no  History of chronic HTN, hyperlipidemia, elevated A1C no  High risk race (, , ,  or ) no    Hypertension- if you answer yes to any of the following, please order baseline preeclampsia labs (cbc, comprehensive metabolic panel, urine protein creatinine ratio, uric acid)  History of of chronic HTN no  History of gestational HTN no  History of preeclampsia, eclampsia, or HELLP syndrome no  History of diabetes no  History of lupus, autoimmune disease, kidney disease no    Thyroid- if yes order TSH with reflex T4  History of thyroid disease no    Bleeding Disorder or Hx of DVT-patient or first degree relative with history of. Order the following if not done previously.   (Factor V, antithrombin III, prothrombin gene mutation, protein C and S Ag, lupus anticoagulant, anticardiolipin, beta-2 glycoprotein)   no    OB/GYN-  History of abnormal pap smear YES       Date of last pap smear 3/18/2018  History of HPV YES  History of Herpes/HSV YES  History of other STI (gonorrhea, chlamydia, trich)  no  History of prior  YES  History of prior  no  History of  delivery prior to 36 weeks 6 days no  History of blood transfusion no  Ok for blood transfusion YES    Substance screening-   History of tobacco use YES, quit in   Currently using tobacco no  Substance Use Screen Level (N/A, LOW, HIGH) NA    MRSA Screening-   Does the pt have a hx of MRSA? no    Immunizations:  Influenza vaccine given this season no  Discussed Tdap vaccine yes  Discussed COVID Vaccine no    Genetic/MFM-  Do you or your partner have a history of any of the following in yourselves or first degree relatives?  Cystic fibrosis no  Spinal muscular atrophy no  Hemoglobinopathy/Sickle Cell/Thalassemia no  Fragile X Intellectual Disability no    If yes, discuss Carrier Screening and recommend consultation with MFM/Genetic Counseling and place specific [facility] Referral for.    If no, discuss Carrier Screening being completed once in a lifetime as a standard of care lab test. Place orders for Cystic Fibrosis Gene Test (XWD460) and Spinal Muscular Atrophy DNA (PRR4441)      Appointment for Nuchal Translucency Ultrasound at [facility] scheduled for       Interview education  [facility] Pregnancy Essentials Book reviewed, discussed and attached to their AVS yes    Nurse/Family Partnership- patient may qualify no; referral placed no    Prenatal lab work scripts yes  Extra labs ordered:  1 hr gtt  CF/SMA     Aspirin/Preeclampsia Screen    Risk Level Risk Factor Recommendation   LOW Prior Uncomplicated full-term delivery YES No Aspirin recommendation        MODERATE Nulliparity no Recommend low-dose aspirin if     BMI>30 YES 2 or more moderate risk factors    Family History Preeclampsia (mother/sister) no     35yr old or greater YES      or Low Socioeconomic no     IVF Pregnancy  no     Personal History Risks (low birth weight, prior adverse preg outcome, >10yr preg interval) YES Shortened cervix at 22 weeks, placental  abruption at del        HIGH History of Preeclampsia no Recommend low-dose aspirin if     Multifetal gestation no 1 or more high risk factors    Chronic HTN no     Type 1 or 2 Diabetes no     Renal Disease no     Autoimmune Disease  no      Contraindications to ASA therapy:  NSAID/ ASA allergy: no  Nasal polyps: no  Asthma with history of ASA induced bronchospasm: no  Relative contraindications:  History of GI bleed: no  Active peptic ulcer disease: no  Severe hepatic dysfunction: no    Patient should be recommended to take ASA 162mg during this pregnancy from 12-36wks to lower her risk of preeclampsia: meets criteria- patient aware to start ASA therapy       The patient has a history now or in prior pregnancy notable for:  placental abruption and Shortened cervix at 22 weeks w/ bedrest patient declined cerclage previously. HSV, abnormal pap smears       Details that I feel the provider should be aware of: This is an unplanned but welcomed pregnancy for Jolie and her significant other Gm. They have one child together, Gm (5) [facility] in [facility] via [facility]- she suffered a placental abruption and the pregnancy was complicated by a shortened cervix at 22 weeks where she was put on pelvic and bed rest until she delivered at 38 weeks. She is aware of PN1 labs, early glucose testing, and carrier screening scheduling.     PN1 visit scheduled. The patient was oriented to our practice, the navigator role, reviewed delivering physicians and [facility] for Delivery. All questions were answered.    Interviewed by: Jeana Narayan

## 2024-04-23 PROBLEM — O09.292 PRIOR PREGNANCY WITH PLACENTA ABRUPTION IN SECOND TRIMESTER, ANTEPARTUM: Status: ACTIVE | Noted: 2024-04-12

## 2024-04-23 PROBLEM — Z3A.14 14 WEEKS GESTATION OF PREGNANCY: Status: ACTIVE | Noted: 2024-03-22

## 2024-04-23 PROBLEM — O09.522 MULTIGRAVIDA OF ADVANCED MATERNAL AGE IN SECOND TRIMESTER: Status: ACTIVE | Noted: 2024-04-12

## 2024-04-23 NOTE — PATIENT INSTRUCTIONS
Pregnancy at 11 to 14 Weeks   WHAT YOU NEED TO KNOW:   You are now at the end of your first trimester and entering your second trimester. Morning sickness usually goes away by this time. You may have other symptoms such as fatigue, frequent urination, and headaches. You may have gained 2 to 4 pounds by now.  DISCHARGE INSTRUCTIONS:   Return to the emergency department if:   You have pain or cramping in your abdomen or low back.    You have heavy vaginal bleeding or clotting.    You pass material that looks like tissue or large clots. Collect the material and bring it with you.    Call your doctor or obstetrician if:   You cannot keep food or drinks down, and you are losing weight.    You have light bleeding.    You have chills or a fever.    You have vaginal itching, burning, or pain.    You have yellow, green, white, or foul-smelling vaginal discharge.    You have pain or burning when you urinate, less urine than usual, or pink or bloody urine.    You have questions or concerns about your condition or care.    How to care for yourself at this stage of your pregnancy:       Get plenty of rest.  You may feel more tired than normal. You may need to take naps or go to bed earlier.    Manage nausea and vomiting.  Avoid fatty and spicy foods. Eat small meals throughout the day instead of large meals. Sushma may help to decrease nausea. Ask your healthcare provider about other ways of decreasing nausea and vomiting.    Eat a variety of healthy foods.  Healthy foods include fruits, vegetables, whole-grain breads, low-fat dairy foods, beans, lean meats, and fish. Drink liquids as directed. Ask how much liquid to drink each day and which liquids are best for you. Limit caffeine to less than 200 milligrams each day. Limit your intake of fish to 2 servings each week. Choose fish low in mercury such as canned light tuna, shrimp, salmon, cod, or tilapia. Do not  eat fish high in mercury such as swordfish, tilefish, gena  mackerel, and shark.         Take prenatal vitamins as directed.  Your need for certain vitamins and minerals, such as folic acid, increases during pregnancy. Prenatal vitamins provide some of the extra vitamins and minerals you need. Prenatal vitamins may also help to decrease the risk of certain birth defects.         Do not smoke.  Smoking increases your risk of a miscarriage and other health problems during your pregnancy. Smoking can cause your baby to be born too early or weigh less at birth. Ask your healthcare provider for information if you need help quitting.    Do not drink alcohol.  Alcohol passes from your body to your baby through the placenta. It can affect your baby's brain development and cause fetal alcohol syndrome (FAS). FAS is a group of conditions that causes mental, behavior, and growth problems.    Talk to your healthcare provider before you take any medicines.  Many medicines may harm your baby if you take them when you are pregnant. Do not take any medicines, vitamins, herbs, or supplements without first talking to your healthcare provider. Never use illegal or street drugs (such as marijuana or cocaine) while you are pregnant.  Safety tips during pregnancy:   Avoid hot tubs and saunas.  Do not use a hot tub or sauna while you are pregnant, especially during your first trimester. Hot tubs and saunas may raise your baby's temperature and increase the risk of birth defects.    Avoid toxoplasmosis.  This is an infection caused by eating raw meat or being around infected cat feces. It can cause birth defects, miscarriages, and other problems. Wash your hands after you touch raw meat. Make sure any meat is well-cooked before you eat it. Avoid raw eggs and unpasteurized milk. Use gloves or ask someone else to clean your cat's litter box while you are pregnant.    Changes happening with your baby:  Your baby has fully formed fingernails and toenails. Your baby's heartbeat can now be heard. Ask  your healthcare provider if you can listen to your baby's heartbeat. By week 14, your baby is over 4 inches long from the top of the head to the rump (baby's bottom). Your baby weighs over 3 ounces.  Prenatal care:  Prenatal care is a series of visits with your healthcare provider throughout your pregnancy. During the first 28 weeks of your pregnancy, you will see your healthcare provider 1 time each month. Prenatal care can help prevent problems during pregnancy and childbirth. Your healthcare provider will check your blood pressure and weight. Your baby's heart rate will also be checked. You may also need the following at some visits:  A pelvic exam  allows your healthcare provider to see your cervix (the bottom part of your uterus). Your healthcare provider will use a speculum to open your vagina. He or she will check the size and shape of your uterus.    Blood tests  may be done to check for any of the following:    Gestational diabetes or anemia (low iron level)    Blood type or Rh factor, or certain birth defects    Immunity to certain diseases, such as chickenpox or rubella    An infection, such as a sexually transmitted infection, HIV, or hepatitis B    Hepatitis B  may need to be prevented or treated. Hepatitis B is inflammation of the liver caused by the hepatitis B virus (HBV). HBV can spread from a mother to her baby during delivery. You will be checked for HBV as early as possible in the first trimester of each pregnancy. You need the test even if you received the hepatitis B vaccine or were tested before. You may need to have an HBV infection treated before you give birth.    Urine tests  may also be done to check for sugar and protein. These can be signs of gestational diabetes or preeclampsia. Urine tests may also be done to check for signs of infection.    A fetal ultrasound  shows pictures of your baby inside your uterus. The pictures are used to check your baby's development, movement, and  position.         Genetic disorder screening tests  may be offered to you. These tests check your baby's risk for genetic disorders such as Down syndrome. A screening test includes a blood test and ultrasound.    Follow up with your doctor or obstetrician as directed:  Go to all prenatal visits. Write down your questions so you remember to ask them during your visits.  © Copyright [employer] 2023 Information is for End User's use only and may not be sold, redistributed or otherwise used for commercial purposes.  The above information is an  only. It is not intended as medical advice for individual conditions or treatments. Talk to your doctor, nurse or pharmacist before following any medical regimen to see if it is safe and effective for you.

## 2024-04-25 ENCOUNTER — INITIAL PRENATAL (OUTPATIENT)
Age: 44
End: 2024-04-25
Payer: COMMERCIAL

## 2024-04-25 VITALS
HEART RATE: 75 BPM | WEIGHT: 180 LBS | SYSTOLIC BLOOD PRESSURE: 108 MMHG | DIASTOLIC BLOOD PRESSURE: 78 MMHG | BODY MASS INDEX: 30.9 KG/M2

## 2024-04-25 DIAGNOSIS — Z11.3 SCREEN FOR STD (SEXUALLY TRANSMITTED DISEASE): ICD-10-CM

## 2024-04-25 DIAGNOSIS — Z34.82 ENCOUNTER FOR SUPERVISION OF OTHER NORMAL PREGNANCY, SECOND TRIMESTER: Primary | ICD-10-CM

## 2024-04-25 DIAGNOSIS — Z36.9 ANTENATAL SCREENING ENCOUNTER: ICD-10-CM

## 2024-04-25 DIAGNOSIS — O35.2XX0 HEREDITARY DISEASE IN FAMILY POSSIBLY AFFECTING FETUS, AFFECTING MANAGEMENT OF MOTHER IN PREGNANCY, SINGLE OR UNSPECIFIED FETUS: ICD-10-CM

## 2024-04-25 DIAGNOSIS — O09.292 PRIOR PREGNANCY WITH PLACENTA ABRUPTION IN SECOND TRIMESTER, ANTEPARTUM: ICD-10-CM

## 2024-04-25 DIAGNOSIS — A60.9 HSV (HERPES SIMPLEX VIRUS) ANOGENITAL INFECTION: ICD-10-CM

## 2024-04-25 DIAGNOSIS — R63.5 WEIGHT GAIN: ICD-10-CM

## 2024-04-25 DIAGNOSIS — Z87.59 HISTORY OF POOR PREGNANCY OUTCOME: ICD-10-CM

## 2024-04-25 DIAGNOSIS — O09.522 MULTIGRAVIDA OF ADVANCED MATERNAL AGE IN SECOND TRIMESTER: ICD-10-CM

## 2024-04-25 DIAGNOSIS — Z12.4 PAP SMEAR FOR CERVICAL CANCER SCREENING: ICD-10-CM

## 2024-04-25 DIAGNOSIS — Z3A.14 14 WEEKS GESTATION OF PREGNANCY: ICD-10-CM

## 2024-04-25 PROBLEM — N94.6 DYSMENORRHEA: Status: RESOLVED | Noted: 2022-03-18 | Resolved: 2024-04-25

## 2024-04-25 PROCEDURE — 99213 OFFICE O/P EST LOW 20 MIN: CPT | Performed by: NURSE PRACTITIONER

## 2024-04-25 PROCEDURE — G0145 SCR C/V CYTO,THINLAYER,RESCR: HCPCS | Performed by: NURSE PRACTITIONER

## 2024-04-25 PROCEDURE — 87491 CHLMYD TRACH DNA AMP PROBE: CPT | Performed by: NURSE PRACTITIONER

## 2024-04-25 PROCEDURE — G0476 HPV COMBO ASSAY CA SCREEN: HCPCS | Performed by: NURSE PRACTITIONER

## 2024-04-25 PROCEDURE — 87591 N.GONORRHOEAE DNA AMP PROB: CPT | Performed by: NURSE PRACTITIONER

## 2024-04-25 NOTE — PROGRESS NOTES
Problem   14 Weeks Gestation of Pregnancy    Blood Type:      Antibody screen:  Pap done 4/25  GC/CT -  done  PN1 Labs- ordered  28 Week Labs-      Blue folder- reviewed  Yellow folder-    MatT21 neg  AFP- ordered  Level 1 5/9  Level 2- 6/6    TDAP -   Rhogam? -   Delivery consent-  Breast pump -   Pediatrician -  L&D forms-    Perineal massage -  GBS swab -   IOL -           Hsv (Herpes Simplex Virus) Anogenital Infection    Her prior  pregnancy was also complicated by a herpes outbreak at 26 weeks. Recommend valtrex prophylaxis at 36week. If she has frequent outbreaks prior to pregnancy or one in pregnancy can offer prophylaxis throughout pregnancy. Last HSV outbreak was 2 weeks ago, will monitor.     Dysmenorrhea (Resolved)     14 weeks gestation of pregnancy  Here for 1st OB appointment accompanied by no one. Feels well but uncomfortable d/t recent weight gain (states 40 lbs but not sure if her scale is accurate).  Discussed mindful eating and walking/light exercise. Pap/GC/CT done today.  She has not done her labs yet but will do so in 4 days when she will get her MSAFP and her 1 hour glucose test done.  A TSH was also ordered to her labs. Reports hx of abnml Paps. Denies LOF/CTX/VB. No pregnancy concerns expressed by patient. All questions answered. Oriented to our practice. Blue folder given and reviewed at OB Intake. She was advised to stop taking oral iodine for the rest of the pregnancy, states she has only taken about 5 doses this pregnancy. Will discuss further with M.

## 2024-04-29 LAB
HPV HR 12 DNA CVX QL NAA+PROBE: NEGATIVE
HPV16 DNA CVX QL NAA+PROBE: NEGATIVE
HPV18 DNA CVX QL NAA+PROBE: NEGATIVE

## 2024-04-30 LAB
C TRACH DNA SPEC QL NAA+PROBE: NEGATIVE
N GONORRHOEA DNA SPEC QL NAA+PROBE: NEGATIVE

## 2024-05-02 ENCOUNTER — APPOINTMENT (OUTPATIENT)
Dept: LAB | Facility: CLINIC | Age: 44
End: 2024-05-02
Payer: COMMERCIAL

## 2024-05-02 DIAGNOSIS — Z34.81 PRENATAL CARE, SUBSEQUENT PREGNANCY, FIRST TRIMESTER: ICD-10-CM

## 2024-05-02 LAB
ABO GROUP BLD: NORMAL
BACTERIA UR QL AUTO: ABNORMAL /HPF
BASOPHILS # BLD AUTO: 0.04 THOUSANDS/ÂΜL (ref 0–0.1)
BASOPHILS NFR BLD AUTO: 1 % (ref 0–1)
BILIRUB UR QL STRIP: NEGATIVE
BLD GP AB SCN SERPL QL: NEGATIVE
CLARITY UR: CLEAR
COLOR UR: ABNORMAL
EOSINOPHIL # BLD AUTO: 0.08 THOUSAND/ÂΜL (ref 0–0.61)
EOSINOPHIL NFR BLD AUTO: 1 % (ref 0–6)
ERYTHROCYTE [DISTWIDTH] IN BLOOD BY AUTOMATED COUNT: 13.8 % (ref 11.6–15.1)
GLUCOSE 1H P 50 G GLC PO SERPL-MCNC: 102 MG/DL (ref 70–134)
GLUCOSE UR STRIP-MCNC: NEGATIVE MG/DL
HCT VFR BLD AUTO: 35.9 % (ref 34.8–46.1)
HGB BLD-MCNC: 11.6 G/DL (ref 11.5–15.4)
HGB UR QL STRIP.AUTO: NEGATIVE
IMM GRANULOCYTES # BLD AUTO: 0.05 THOUSAND/UL (ref 0–0.2)
IMM GRANULOCYTES NFR BLD AUTO: 1 % (ref 0–2)
KETONES UR STRIP-MCNC: ABNORMAL MG/DL
LEUKOCYTE ESTERASE UR QL STRIP: NEGATIVE
LYMPHOCYTES # BLD AUTO: 1.98 THOUSANDS/ÂΜL (ref 0.6–4.47)
LYMPHOCYTES NFR BLD AUTO: 23 % (ref 14–44)
MCH RBC QN AUTO: 29.7 PG (ref 26.8–34.3)
MCHC RBC AUTO-ENTMCNC: 32.3 G/DL (ref 31.4–37.4)
MCV RBC AUTO: 92 FL (ref 82–98)
MONOCYTES # BLD AUTO: 0.65 THOUSAND/ÂΜL (ref 0.17–1.22)
MONOCYTES NFR BLD AUTO: 8 % (ref 4–12)
NEUTROPHILS # BLD AUTO: 5.67 THOUSANDS/ÂΜL (ref 1.85–7.62)
NEUTS SEG NFR BLD AUTO: 66 % (ref 43–75)
NITRITE UR QL STRIP: NEGATIVE
NON-SQ EPI CELLS URNS QL MICRO: ABNORMAL /HPF
NRBC BLD AUTO-RTO: 0 /100 WBCS
PH UR STRIP.AUTO: 6 [PH]
PLATELET # BLD AUTO: 264 THOUSANDS/UL (ref 149–390)
PMV BLD AUTO: 8.7 FL (ref 8.9–12.7)
PROT UR STRIP-MCNC: NEGATIVE MG/DL
RBC # BLD AUTO: 3.9 MILLION/UL (ref 3.81–5.12)
RBC #/AREA URNS AUTO: ABNORMAL /HPF
RH BLD: POSITIVE
RUBV IGG SERPL IA-ACNC: 35.4 IU/ML
SP GR UR STRIP.AUTO: 1.01 (ref 1–1.03)
UROBILINOGEN UR STRIP-ACNC: <2 MG/DL
WBC # BLD AUTO: 8.47 THOUSAND/UL (ref 4.31–10.16)
WBC #/AREA URNS AUTO: ABNORMAL /HPF

## 2024-05-02 PROCEDURE — 87086 URINE CULTURE/COLONY COUNT: CPT

## 2024-05-02 PROCEDURE — 86780 TREPONEMA PALLIDUM: CPT

## 2024-05-02 PROCEDURE — 87389 HIV-1 AG W/HIV-1&-2 AB AG IA: CPT

## 2024-05-02 PROCEDURE — 86850 RBC ANTIBODY SCREEN: CPT

## 2024-05-02 PROCEDURE — 86900 BLOOD TYPING SEROLOGIC ABO: CPT

## 2024-05-02 PROCEDURE — 86901 BLOOD TYPING SEROLOGIC RH(D): CPT

## 2024-05-02 PROCEDURE — 87340 HEPATITIS B SURFACE AG IA: CPT

## 2024-05-02 PROCEDURE — 86762 RUBELLA ANTIBODY: CPT

## 2024-05-02 PROCEDURE — 82950 GLUCOSE TEST: CPT

## 2024-05-02 PROCEDURE — 85025 COMPLETE CBC W/AUTO DIFF WBC: CPT

## 2024-05-02 PROCEDURE — 86803 HEPATITIS C AB TEST: CPT

## 2024-05-02 PROCEDURE — 81001 URINALYSIS AUTO W/SCOPE: CPT

## 2024-05-02 PROCEDURE — 36415 COLL VENOUS BLD VENIPUNCTURE: CPT

## 2024-05-03 LAB
HBV SURFACE AG SER QL: NORMAL
HCV AB SER QL: NORMAL
HIV 1+2 AB+HIV1 P24 AG SERPL QL IA: NORMAL
HIV 2 AB SERPL QL IA: NORMAL
HIV1 AB SERPL QL IA: NORMAL
HIV1 P24 AG SERPL QL IA: NORMAL
TREPONEMA PALLIDUM IGG+IGM AB [PRESENCE] IN SERUM OR PLASMA BY IMMUNOASSAY: NORMAL

## 2024-05-04 LAB — BACTERIA UR CULT: ABNORMAL

## 2024-05-06 LAB
LAB AP GYN PRIMARY INTERPRETATION: NORMAL
LAB AP LMP: NORMAL
Lab: NORMAL

## 2024-05-08 ENCOUNTER — TELEPHONE (OUTPATIENT)
Dept: OBGYN CLINIC | Facility: CLINIC | Age: 44
End: 2024-05-08

## 2024-05-08 NOTE — TELEPHONE ENCOUNTER
----- Message from Jeana Narayan RN sent at 2024  3:00 PM EDT -----  Regardinnd Trimester Call Due  -6/3

## 2024-05-09 ENCOUNTER — ROUTINE PRENATAL (OUTPATIENT)
Dept: PERINATAL CARE | Facility: OTHER | Age: 44
End: 2024-05-09
Payer: COMMERCIAL

## 2024-05-09 ENCOUNTER — APPOINTMENT (OUTPATIENT)
Dept: LAB | Facility: HOSPITAL | Age: 44
End: 2024-05-09
Payer: COMMERCIAL

## 2024-05-09 VITALS
HEART RATE: 67 BPM | HEIGHT: 64 IN | WEIGHT: 184 LBS | SYSTOLIC BLOOD PRESSURE: 90 MMHG | BODY MASS INDEX: 31.41 KG/M2 | DIASTOLIC BLOOD PRESSURE: 50 MMHG

## 2024-05-09 DIAGNOSIS — R63.5 WEIGHT GAIN: ICD-10-CM

## 2024-05-09 DIAGNOSIS — Z31.430 ENCOUNTER OF FEMALE FOR TESTING FOR GENETIC DISEASE CARRIER STATUS FOR PROCREATIVE MANAGEMENT: ICD-10-CM

## 2024-05-09 DIAGNOSIS — Z3A.16 16 WEEKS GESTATION OF PREGNANCY: Primary | ICD-10-CM

## 2024-05-09 DIAGNOSIS — Z34.81 PRENATAL CARE, SUBSEQUENT PREGNANCY, FIRST TRIMESTER: ICD-10-CM

## 2024-05-09 DIAGNOSIS — Z36.9 ANTENATAL SCREENING ENCOUNTER: ICD-10-CM

## 2024-05-09 DIAGNOSIS — Z36.86 ENCOUNTER FOR ANTENATAL SCREENING FOR CERVICAL LENGTH: ICD-10-CM

## 2024-05-09 DIAGNOSIS — O44.42 LOW LYING PLACENTA NOS OR WITHOUT HEMORRHAGE, SECOND TRIMESTER: ICD-10-CM

## 2024-05-09 DIAGNOSIS — Z36.9 ENCOUNTER FOR ANTENATAL SCREENING OF MOTHER: ICD-10-CM

## 2024-05-09 LAB — TSH SERPL DL<=0.05 MIU/L-ACNC: 1.47 UIU/ML (ref 0.45–4.5)

## 2024-05-09 PROCEDURE — 84443 ASSAY THYROID STIM HORMONE: CPT

## 2024-05-09 PROCEDURE — 76817 TRANSVAGINAL US OBSTETRIC: CPT | Performed by: OBSTETRICS & GYNECOLOGY

## 2024-05-09 PROCEDURE — 76815 OB US LIMITED FETUS(S): CPT | Performed by: OBSTETRICS & GYNECOLOGY

## 2024-05-09 PROCEDURE — 36415 COLL VENOUS BLD VENIPUNCTURE: CPT

## 2024-05-09 PROCEDURE — 82105 ALPHA-FETOPROTEIN SERUM: CPT

## 2024-05-09 PROCEDURE — 99213 OFFICE O/P EST LOW 20 MIN: CPT | Performed by: OBSTETRICS & GYNECOLOGY

## 2024-05-09 NOTE — LETTER
"May 9, 2024     SUBHASH Guerrero  [address]   [address]  [facility] [address]    Patient: Jolie Garcia   YOB: 1980   Date of Visit: 2024       Dear SUBHASH Elaine:    Thank you for referring Jolie Garcia to me for evaluation. Below are my notes for this consultation.    If you have questions, please do not hesitate to call me. I look forward to following your patient along with you.         Sincerely,        Gala Tay MD        CC: No Recipients    Gala Tay MD  2024 12:18 PM  Sign when Signing Visit  [facility]: Jolie Garcia was seen today at 16w3d for serial cervical length screening ultrasound.  See ultrasound report under \"OB Procedures\" tab.  Please don't hesitate to contact our office with any concerns or questions.  -Gala Tay MD         "

## 2024-05-09 NOTE — PROGRESS NOTES
Ultrasound Probe Disinfection    A transvaginal ultrasound was performed.   Prior to use, disinfection was performed with High Level Disinfection Process (Isabella Oliveron).  Probe serial number [id number]: [id number] was used.      Heavenly Mendoza  05/09/24  11:28 AM

## 2024-05-09 NOTE — PROGRESS NOTES
"[facility]: Jolie Garcia was seen today at 16w3d for serial cervical length screening ultrasound.  See ultrasound report under \"OB Procedures\" tab.  Please don't hesitate to contact our office with any concerns or questions.  -Gala Tay MD      "

## 2024-05-15 LAB
2ND TRIMESTER 4 SCREEN SERPL-IMP: NORMAL
AFP ADJ MOM SERPL: 1.58
AFP INTERP AMN-IMP: NORMAL
AFP INTERP SERPL-IMP: NORMAL
AFP INTERP SERPL-IMP: NORMAL
AFP SERPL-MCNC: 40 NG/ML
AGE AT DELIVERY: 42.5 YR
GA METHOD: NORMAL
GA: 16.4 WEEKS
IDDM PATIENT QL: YES
MULTIPLE PREGNANCY: NO
NEURAL TUBE DEFECT RISK FETUS: 660 %

## 2024-05-21 ENCOUNTER — NURSE TRIAGE (OUTPATIENT)
Age: 44
End: 2024-05-21

## 2024-05-21 DIAGNOSIS — A60.9 HSV (HERPES SIMPLEX VIRUS) ANOGENITAL INFECTION: Primary | ICD-10-CM

## 2024-05-21 PROBLEM — Z3A.18 18 WEEKS GESTATION OF PREGNANCY: Status: ACTIVE | Noted: 2024-03-22

## 2024-05-21 RX ORDER — LIDOCAINE 50 MG/G
OINTMENT TOPICAL AS NEEDED
Qty: 30 G | Refills: 0 | Status: SHIPPED | OUTPATIENT
Start: 2024-05-21

## 2024-05-21 NOTE — TELEPHONE ENCOUNTER
"Patient called states she is 18.1 weeks pregnant. States she is experiencing herpes outbreak dime sized cluster on vaginal area. States Valtrex taken today. Denies pain, but states uncomfortable. States has appointment scheduled 5/23, but advised should keep appointment. Denies any vaginal bleeding, fever, pain with urination, abdominal pain, or loss of fluid. Advised will make provider aware if any additional recommendation. Care advice reviewed. Advised to call back with worsening symptoms. Patient verbalized understanding. No further questions at this time.     Reason for Disposition   Rash with painful tiny water blisters    Answer Assessment - Initial Assessment Questions  1. SYMPTOM: \"What's the main symptom you're concerned about?\" (e.g., rash, itching, swelling, dryness)      Herpes outbreak 3 white heads clustered -dime sized. valtrex taken today   2. LOCATION: \"Where is the  sores located?\" (e.g., inside/outside, left/right)      Above on outside  3. ONSET: \"When did the  symptoms  start?\"      today  4. PAIN: \"Is there any pain?\" If Yes, ask: \"How bad is it?\" (Scale: 1-10; mild, moderate, severe)    -  MILD (1-3): doesn't interfere with normal activities     -  MODERATE (4-7): interferes with normal activities (e.g., work or school) or awakens from sleep      -  SEVERE (8-10): excruciating pain, unable to do any normal activities      Uncomfortable   5. CAUSE: \"What do you think is causing the symptoms?\"      outbreak  6. OTHER SYMPTOMS: \"Do you have any other symptoms?\" (e.g., fever, vaginal bleeding, pain with urination)      denies  7. PREGNANCY: \"Is there any chance you are pregnant?\" \"When was your last menstrual period?\"      18.1 weeks    Protocols used: Vulvar Symptoms-ADULT-[place]    "

## 2024-05-21 NOTE — TELEPHONE ENCOUNTER
Pt returning a call. RN provided recommendations. Pt verbalized an understanding. No further questions at this time.

## 2024-05-21 NOTE — TELEPHONE ENCOUNTER
Called patient left non detailed voicemail. Advised to call office back to review providers recommendation.

## 2024-05-22 NOTE — ASSESSMENT & PLAN NOTE
Suppressive therapy at 36 weeks.   Admits to recent outbreak which is now resolved.  Used valtrex as prescribed.   Declined need for reil. Will call if needed.

## 2024-05-22 NOTE — ASSESSMENT & PLAN NOTE
Jolie Garcia is a 44 y.o.  here for OB visit at 18w3d weeks. TWG  4.082 kg (9 lb).  No health concerns. (Visit addended post visit as we had no electricity during the time of her visit.)     Denies LOF, vaginal bleeding or contractions. Does admit to a pelvic heaviness at time. Experienced yesterday at work. Lessened and she has sex last night. No complaints.   Discussed PTL precautions.Aware of any persistent or recurring symptoms to call office.   AFP screen negative  MFM appt 24 MFM report. CGA of 16 weeks and 3 days. Vertex presentation. Posterior low lying placenta. Normal JAMES. Cervical length of 4.03 cm. Continued serial cervical surveillance. Call with any vaginal bleeding.   RTO in 4 weeks.

## 2024-05-23 ENCOUNTER — ROUTINE PRENATAL (OUTPATIENT)
Dept: OBGYN CLINIC | Facility: MEDICAL CENTER | Age: 44
End: 2024-05-23
Payer: COMMERCIAL

## 2024-05-23 VITALS — OXYGEN SATURATION: 99 % | HEART RATE: 65 BPM | DIASTOLIC BLOOD PRESSURE: 62 MMHG | SYSTOLIC BLOOD PRESSURE: 102 MMHG

## 2024-05-23 DIAGNOSIS — O09.292 PRIOR PREGNANCY WITH PLACENTA ABRUPTION IN SECOND TRIMESTER, ANTEPARTUM: ICD-10-CM

## 2024-05-23 DIAGNOSIS — O44.42 LOW LYING PLACENTA NOS OR WITHOUT HEMORRHAGE, SECOND TRIMESTER: ICD-10-CM

## 2024-05-23 DIAGNOSIS — Z87.59 HISTORY OF POOR PREGNANCY OUTCOME: ICD-10-CM

## 2024-05-23 DIAGNOSIS — A60.9 HSV (HERPES SIMPLEX VIRUS) ANOGENITAL INFECTION: ICD-10-CM

## 2024-05-23 DIAGNOSIS — O09.522 MULTIGRAVIDA OF ADVANCED MATERNAL AGE IN SECOND TRIMESTER: Primary | ICD-10-CM

## 2024-05-23 DIAGNOSIS — Z3A.18 18 WEEKS GESTATION OF PREGNANCY: ICD-10-CM

## 2024-05-23 LAB
SL AMB  POCT GLUCOSE, UA: NORMAL
SL AMB POCT URINE PROTEIN: NORMAL

## 2024-05-23 PROCEDURE — 81002 URINALYSIS NONAUTO W/O SCOPE: CPT | Performed by: NURSE PRACTITIONER

## 2024-05-23 PROCEDURE — 99213 OFFICE O/P EST LOW 20 MIN: CPT | Performed by: NURSE PRACTITIONER

## 2024-05-23 NOTE — PROGRESS NOTES
Problem   Low Lying Placenta NOS Or Without Hemorrhage, Second Trimester   Prior Pregnancy With Placenta Abruption in Second Trimester, Antepartum   Multigravida of Advanced Maternal Age in Second Trimester    PNC: Assessment of interval growth is recommended at 32 weeks gestation, sooner if otherwise clinically indicated.  Her age is associated with an increased risk for stillbirth.    Weekly nonstress testing is recommended for additional pregnancy surveillance beginning at 36 weeks gestation, sooner if otherwise clinically indicated    NIPT pending     18 Weeks Gestation of Pregnancy    Blood Type:      Antibody screen:  Pap done   GC/CT -  done  PN1 Labs- ordered  28 Week Labs-      Blue folder- reviewed  Yellow folder-    MatT21 neg  AFP- ordered  Level 1   Level 2-     TDAP -   Rhogam? -   Delivery consent-  Breast pump -   Pediatrician -  L&D forms-    Perineal massage -  GBS swab -   IOL -           Hsv (Herpes Simplex Virus) Anogenital Infection    Her prior  pregnancy was also complicated by a herpes outbreak at 26 weeks. Recommend valtrex prophylaxis at 36week. If she has frequent outbreaks prior to pregnancy or one in pregnancy can offer prophylaxis throughout pregnancy. Last HSV outbreak was 2 weeks ago, will monitor.     '18 weeks gestation of pregnancy  Jolie Garcia is a 44 y.o.  here for OB visit at 18w3d weeks. TWG  4.082 kg (9 lb).  No health concerns. (Visit addended post visit as we had no electricity during the time of her visit.)     Denies LOF, vaginal bleeding or contractions. Does admit to a pelvic heaviness at time. Experienced yesterday at work. Lessened and she has sex last night. No complaints.   Discussed PTL precautions.Aware of any persistent or recurring symptoms to call office.   AFP screen negative  MFM appt 24 MFM report. CGA of 16 weeks and 3 days. Vertex presentation. Posterior low lying placenta. Normal JAMES. Cervical length of 4.03 cm. Continued  serial cervical surveillance. Call with any vaginal bleeding.   RTO in 4 weeks.    Low lying placenta nos or without hemorrhage, second trimester  MFM appt on 5/23/24    Prior pregnancy with placenta abruption in second trimester, antepartum  MFM appt 5/23/24  Denies vaginal bleeding     HSV (herpes simplex virus) anogenital infection  Suppressive therapy at 36 weeks.   Admits to recent outbreak which is now resolved.  Used valtrex as prescribed.   Declined need for reil. Will call if needed.     Multigravida of advanced maternal age in second trimester  NIPT negative  MFM appt 5/23/24

## 2024-05-28 ENCOUNTER — ROUTINE PRENATAL (OUTPATIENT)
Dept: PERINATAL CARE | Facility: OTHER | Age: 44
End: 2024-05-28
Payer: COMMERCIAL

## 2024-05-28 VITALS
HEART RATE: 92 BPM | SYSTOLIC BLOOD PRESSURE: 100 MMHG | BODY MASS INDEX: 32.27 KG/M2 | WEIGHT: 189 LBS | DIASTOLIC BLOOD PRESSURE: 58 MMHG | HEIGHT: 64 IN

## 2024-05-28 DIAGNOSIS — Z36.86 ENCOUNTER FOR ANTENATAL SCREENING FOR CERVICAL LENGTH: ICD-10-CM

## 2024-05-28 DIAGNOSIS — Z3A.19 19 WEEKS GESTATION OF PREGNANCY: Primary | ICD-10-CM

## 2024-05-28 DIAGNOSIS — N88.3 SHORT CERVIX: ICD-10-CM

## 2024-05-28 PROBLEM — O44.42 LOW LYING PLACENTA NOS OR WITHOUT HEMORRHAGE, SECOND TRIMESTER: Status: RESOLVED | Noted: 2024-05-09 | Resolved: 2024-05-28

## 2024-05-28 PROCEDURE — 99213 OFFICE O/P EST LOW 20 MIN: CPT | Performed by: OBSTETRICS & GYNECOLOGY

## 2024-05-28 PROCEDURE — 76817 TRANSVAGINAL US OBSTETRIC: CPT | Performed by: OBSTETRICS & GYNECOLOGY

## 2024-05-28 PROCEDURE — 76815 OB US LIMITED FETUS(S): CPT | Performed by: OBSTETRICS & GYNECOLOGY

## 2024-05-28 NOTE — LETTER
"May 28, 2024     SUBHASH Ferreira  [address]  [facility] [address]    Patient: Jolie Garcia   YOB: 1980   Date of Visit: 2024       Dear SUBHASH Berger:    Thank you for referring Jolie Garcia to me for evaluation. Below are my notes for this consultation.    If you have questions, please do not hesitate to call me. I look forward to following your patient along with you.         Sincerely,        Gala Tay MD        CC: No Recipients    Gala Tay MD  2024 11:57 AM  Sign when Signing Visit  [facility]: Jolie Garcia was seen today at 19w1d for serial cervical length screening ultrasound.  See ultrasound report under \"OB Procedures\" tab.  Please don't hesitate to contact our office with any concerns or questions.  -Gala Tay MD    "

## 2024-05-28 NOTE — PROGRESS NOTES
"[facility]: Jolie Garcia was seen today at 19w1d for serial cervical length screening ultrasound.  See ultrasound report under \"OB Procedures\" tab.  Please don't hesitate to contact our office with any concerns or questions.  -Gala Tay MD    "

## 2024-06-03 LAB
SCAN RESULT: NORMAL
SPECIMEN SOURCE: NORMAL

## 2024-06-06 ENCOUNTER — ROUTINE PRENATAL (OUTPATIENT)
Dept: PERINATAL CARE | Facility: OTHER | Age: 44
End: 2024-06-06
Payer: COMMERCIAL

## 2024-06-06 VITALS
HEART RATE: 88 BPM | SYSTOLIC BLOOD PRESSURE: 101 MMHG | BODY MASS INDEX: 32.44 KG/M2 | WEIGHT: 190 LBS | HEIGHT: 64 IN | DIASTOLIC BLOOD PRESSURE: 60 MMHG

## 2024-06-06 DIAGNOSIS — Z36.86 ENCOUNTER FOR ANTENATAL SCREENING FOR CERVICAL LENGTH: ICD-10-CM

## 2024-06-06 DIAGNOSIS — Z3A.20 20 WEEKS GESTATION OF PREGNANCY: ICD-10-CM

## 2024-06-06 DIAGNOSIS — O09.522 MULTIGRAVIDA OF ADVANCED MATERNAL AGE IN SECOND TRIMESTER: Primary | ICD-10-CM

## 2024-06-06 PROCEDURE — 76811 OB US DETAILED SNGL FETUS: CPT | Performed by: OBSTETRICS & GYNECOLOGY

## 2024-06-06 PROCEDURE — 76817 TRANSVAGINAL US OBSTETRIC: CPT | Performed by: OBSTETRICS & GYNECOLOGY

## 2024-06-06 PROCEDURE — 99213 OFFICE O/P EST LOW 20 MIN: CPT | Performed by: OBSTETRICS & GYNECOLOGY

## 2024-06-06 NOTE — PROGRESS NOTES
The patient was seen today for an ultrasound.  Please see ultrasound report (located under Ob Procedures) for additional details.   Thank you very much for allowing us to participate in the care of this very nice patient.  Should you have any questions, please do not hesitate to contact me.     Vitor Whitten MD FACOG  Attending Physician, Maternal-Fetal Medicine  [facility]

## 2024-06-18 NOTE — PROGRESS NOTES
Please refer to the [facility] ultrasound report in Ob Procedures for additional information regarding today's visit

## 2024-06-20 ENCOUNTER — ROUTINE PRENATAL (OUTPATIENT)
Dept: PERINATAL CARE | Facility: OTHER | Age: 44
End: 2024-06-20
Payer: COMMERCIAL

## 2024-06-20 VITALS
BODY MASS INDEX: 32.95 KG/M2 | DIASTOLIC BLOOD PRESSURE: 58 MMHG | HEIGHT: 64 IN | WEIGHT: 193 LBS | HEART RATE: 80 BPM | SYSTOLIC BLOOD PRESSURE: 104 MMHG

## 2024-06-20 DIAGNOSIS — Z3A.22 22 WEEKS GESTATION OF PREGNANCY: ICD-10-CM

## 2024-06-20 DIAGNOSIS — Z36.89 ENCOUNTER FOR FETAL ANATOMIC SURVEY: ICD-10-CM

## 2024-06-20 DIAGNOSIS — O09.292 HISTORY OF PRIOR PREGNANCY WITH SHORT CERVIX, CURRENTLY PREGNANT IN SECOND TRIMESTER: Primary | ICD-10-CM

## 2024-06-20 PROBLEM — Z3A.23 23 WEEKS GESTATION OF PREGNANCY: Status: ACTIVE | Noted: 2024-03-22

## 2024-06-20 PROCEDURE — 76817 TRANSVAGINAL US OBSTETRIC: CPT | Performed by: OBSTETRICS & GYNECOLOGY

## 2024-06-20 PROCEDURE — 76816 OB US FOLLOW-UP PER FETUS: CPT | Performed by: OBSTETRICS & GYNECOLOGY

## 2024-06-20 NOTE — LETTER
June 20, 2024     Adriana Vega MD  [address]  [address]  [place] [address]    Patient: Jolie Garcia   YOB: 1980   Date of Visit: 6/20/2024       Dear Dr. Vega:    Thank you for referring Jolie Garcia to me for evaluation. Below are my notes for this consultation.    If you have questions, please do not hesitate to call me. I look forward to following your patient along with you.         Sincerely,        Gaurang Castanon MD        CC: No Recipients    Gaurang Castanon MD  6/20/2024 11:38 AM  Sign when Signing Visit  Please refer to the [facility] ultrasound report in Ob Procedures for additional information regarding today's visit

## 2024-06-20 NOTE — PATIENT INSTRUCTIONS
Pregnancy at 23 to 26 Weeks   AMBULATORY CARE:   What changes are happening to your body:  You are now close to or at the beginning of the third trimester. The third trimester starts at 24 weeks and ends with delivery. As your baby gets larger, you may develop certain symptoms. These may include pain in your back or down the sides of your abdomen. You may also have stretch marks on your abdomen, breasts, thighs, or buttocks. You may also have constipation.  Seek care immediately if:   You develop a severe headache that does not go away.    You have new or increased vision changes, such as blurred or spotted vision.    You have new or increased swelling in your face or hands.    You have vaginal spotting or bleeding.    Your water broke or you feel warm water gushing or trickling from your vagina.    Call your doctor or obstetrician if:   You have abdominal cramps, pressure, or tightening.    You have a change in vaginal discharge.    You have light bleeding.    You have chills or a fever.    You have vaginal itching, burning, or pain.    You have yellow, green, white, or foul-smelling vaginal discharge.    You have pain or burning when you urinate, less urine than usual, or pink or bloody urine.    You have questions or concerns about your condition or care.    How to care for yourself at this stage of your pregnancy:       Eat a variety of healthy foods.  Healthy foods include fruits, vegetables, whole-grain breads, low-fat dairy foods, beans, lean meats, and fish. Drink liquids as directed. Ask how much liquid to drink each day and which liquids are best for you. Limit caffeine to less than 200 milligrams each day. Limit your intake of fish to 2 servings each week. Choose fish low in mercury such as canned light tuna, shrimp, salmon, cod, or tilapia. Do not  eat fish high in mercury such as swordfish, tilefish, gena mackerel, and shark.         Manage back pain.  Do not stand for long periods of time or lift heavy  items. Use good posture while you stand, squat, or bend. Wear low-heeled shoes with good support. Rest may also help to relieve back pain. Ask your healthcare provider about exercises you can do to strengthen your back muscles.    Take prenatal vitamins as directed.  Your need for certain vitamins and minerals, such as folic acid, increases during pregnancy. Prenatal vitamins provide some of the extra vitamins and minerals you need. Prenatal vitamins may also help to decrease the risk of certain birth defects.         Talk to your healthcare provider about exercise.  Moderate exercise can help you stay fit. Your healthcare provider will help you plan an exercise program that is safe for you during pregnancy.         Do not smoke.  Smoking increases your risk of a miscarriage and other health problems during your pregnancy. Smoking can cause your baby to be born too early or weigh less at birth. Ask your healthcare provider for information if you need help quitting.    Do not drink alcohol.  Alcohol passes from your body to your baby through the placenta. It can affect your baby's brain development and cause fetal alcohol syndrome (FAS). FAS is a group of conditions that causes mental, behavior, and growth problems.    Talk to your healthcare provider before you take any medicines.  Many medicines may harm your baby if you take them when you are pregnant. Do not take any medicines, vitamins, herbs, or supplements without first talking to your healthcare provider. Never use illegal or street drugs (such as marijuana or cocaine) while you are pregnant.  Safety tips during pregnancy:   Avoid hot tubs and saunas.  Do not use a hot tub or sauna while you are pregnant, especially during your first trimester. Hot tubs and saunas may raise your baby's temperature and increase the risk of birth defects.    Avoid toxoplasmosis.  This is an infection caused by eating raw meat or being around infected cat feces. It can cause  birth defects, miscarriages, and other problems. Wash your hands after you touch raw meat. Make sure any meat is well-cooked before you eat it. Avoid raw eggs and unpasteurized milk. Use gloves or ask someone else to clean your cat's litter box while you are pregnant.       Changes that are happening with your baby:  By 26 weeks, your baby will weigh about 2 pounds. Your baby will be about 10 inches long from the top of the head to the rump (baby's bottom). Your baby's movements are much stronger now. Your baby's eyes are almost completely formed and can partially open. Your baby also sleeps and wakes up.  What you need to know about prenatal care:  Your healthcare provider will check your blood pressure and weight. You may also need the following:  A urine test  may also be done to check for sugar and protein. These can be signs of gestational diabetes or infection. Protein in your urine may also be a sign of preeclampsia. Preeclampsia is a condition that can develop during week 20 or later of your pregnancy. It causes high blood pressure, and it can cause problems with your kidneys and other organs.    A gestational diabetes screen  may be done. Your healthcare provider may order either a 1-step or 2-step oral glucose tolerance test (OGTT).     1-step OGTT:  Your blood sugar level will be tested after you have not eaten for 8 hours (fasting). You will then be given a glucose drink. Your level will be tested again 1 hour and 2 hours after you finish the drink.    2-step OGTT:  You do not have to fast for the first part of the test. You will have the glucose drink at any time of day. Your blood sugar level will be checked 1 hour later. If your blood sugar is higher than a certain level, another test will be ordered. You will fast and your blood sugar level will be tested. You will have the glucose drink. Your blood will be tested again 1 hour, 2 hours, and 3 hours after you finish the glucose drink.    Fundal height   is a measurement of your uterus to check your baby's growth. This number is usually the same as the number of weeks that you have been pregnant.    Your baby's heart rate  will be checked.    Follow up with your doctor or obstetrician as directed:  Write down your questions so you remember to ask them during your visits.  © Copyright [employer] 2023 Information is for End User's use only and may not be sold, redistributed or otherwise used for commercial purposes.  The above information is an  only. It is not intended as medical advice for individual conditions or treatments. Talk to your doctor, nurse or pharmacist before following any medical regimen to see if it is safe and effective for you.

## 2024-06-24 ENCOUNTER — ROUTINE PRENATAL (OUTPATIENT)
Dept: OBGYN CLINIC | Facility: CLINIC | Age: 44
End: 2024-06-24
Payer: COMMERCIAL

## 2024-06-24 VITALS — BODY MASS INDEX: 33.71 KG/M2 | SYSTOLIC BLOOD PRESSURE: 112 MMHG | WEIGHT: 196.4 LBS | DIASTOLIC BLOOD PRESSURE: 66 MMHG

## 2024-06-24 DIAGNOSIS — O09.522 MULTIGRAVIDA OF ADVANCED MATERNAL AGE IN SECOND TRIMESTER: ICD-10-CM

## 2024-06-24 DIAGNOSIS — Z3A.23 23 WEEKS GESTATION OF PREGNANCY: ICD-10-CM

## 2024-06-24 DIAGNOSIS — Z34.82 ENCOUNTER FOR SUPERVISION OF OTHER NORMAL PREGNANCY, SECOND TRIMESTER: Primary | ICD-10-CM

## 2024-06-24 DIAGNOSIS — O09.292 PRIOR PREGNANCY WITH PLACENTA ABRUPTION IN SECOND TRIMESTER, ANTEPARTUM: ICD-10-CM

## 2024-06-24 LAB
SL AMB  POCT GLUCOSE, UA: NORMAL
SL AMB POCT URINE PROTEIN: NORMAL

## 2024-06-24 PROCEDURE — 81002 URINALYSIS NONAUTO W/O SCOPE: CPT | Performed by: NURSE PRACTITIONER

## 2024-06-24 PROCEDURE — 99213 OFFICE O/P EST LOW 20 MIN: CPT | Performed by: NURSE PRACTITIONER

## 2024-06-24 NOTE — PROGRESS NOTES
Problem   23 Weeks Gestation of Pregnancy    Blood Type:  A+    Antibody screen: neg  Pap done 4/25  GC/CT -  neg  PN1 Labs- done  28 Week Labs-    Blue folder- reviewed  Yellow folder-    MatT21 neg  AFP- negative  Level 1 5/9  Level 2- 6/6  FG on 8/1 at 28 weeks    TDAP -     Delivery consent-  Breast pump -   Pediatrician -  L&D forms-    Perineal massage -  GBS swab -   IOL -             23 weeks gestation of pregnancy  Feels well. Denies LOF/CTX/VB. Discussed fetal awareness. No concerns.  Advised to increase fluids on the warmer days.  Overview charting updated today.

## 2024-06-24 NOTE — ASSESSMENT & PLAN NOTE
Blln Cath True Dilation 18mm was inflated in the aortic valve.  Feels well. Denies LOF/CTX/VB. Discussed fetal awareness. No concerns.  Advised to increase fluids on the warmer days.  Overview charting updated today.

## 2024-06-24 NOTE — PROGRESS NOTES
Patient is here for prenatal ob visit today.  GA: 23w0d  NELLY:10/21/24    Denies LOF, VB, CTX  +FM    Urine: Protein neg / Glucose neg  Labs utd    Blue folder given at PN1  20 week growth 24    Some swelling in both ankles and feet. More so in L ankle due to prior injury.

## 2024-06-25 ENCOUNTER — TELEPHONE (OUTPATIENT)
Dept: OBGYN CLINIC | Facility: CLINIC | Age: 44
End: 2024-06-25

## 2024-07-06 ENCOUNTER — TELEPHONE (OUTPATIENT)
Dept: OTHER | Facility: OTHER | Age: 44
End: 2024-07-06

## 2024-07-06 ENCOUNTER — NURSE TRIAGE (OUTPATIENT)
Dept: OTHER | Facility: OTHER | Age: 44
End: 2024-07-06

## 2024-07-06 NOTE — TELEPHONE ENCOUNTER
"Answer Assessment - Initial Assessment Questions  1. DISCHARGE: \"Describe the discharge.\" (e.g., white, yellow, green, gray, foamy, cottage cheese-like)      Clear, thicker discharge with tinge of blood  Concerned it was part of mucus plug    2. ODOR: \"Is there a bad odor?\"      Denies    3. ONSET: \"When did the discharge begin?\"      One time discharge late last night    4. RASH: \"Is there a rash in that area?\" If Yes, ask: \"Describe it.\" (e.g., redness, blisters, sores, bumps)      N/A    5. ABDOMINAL PAIN: \"Are you having any abdominal pain?\" If Yes, ask: \"What does it feel like?\" (e.g., crampy, dull, intermittent, constant)       Yes    6. ABDOMINAL PAIN SEVERITY: If present, ask: \"How bad is it?\"  (e.g., Scale 1-10; mild, moderate, or severe)    - MILD (1-3): doesn't interfere with normal activities, abdomen soft and not tender to touch     - MODERATE (4-7): interferes with normal activities or awakens from sleep, tender to touch     - SEVERE (8-10): excruciating pain, doubled over, unable to do any normal activities      \"Like I did crunches\"    7. CAUSE: \"What do you think is causing the discharge?\"      Possibly work related ()    8. OTHER SYMPTOMS: \"Do you have any other symptoms?\" (e.g., fever, itching, vaginal bleeding, pain with urination)      Pain \"shooting\" up vagina last night    9. NELLY: \"What date are you expecting to deliver?\"       Oct 21    10. PREGNANCY: \"How many weeks pregnant are you?\"        24 weeks    Protocols used: Pregnancy - Vaginal Discharge-ADULT-      On call provider advised rest and hydrate; tylenol for cramps/pain.  Monitor for \"gush\" of fluid or continual leaking. If experiencing continual leaking or labor like pain would need to be seen in triage.  Information relayed to patient; verbalized understanding.  "

## 2024-07-06 NOTE — TELEPHONE ENCOUNTER
Reason for Disposition  • Normal vaginal discharge in pregnancy    Protocols used: Pregnancy - Vaginal Discharge-ADULT-AH

## 2024-07-15 PROBLEM — O09.292 HISTORY OF PRIOR PREGNANCY WITH SHORT CERVIX, CURRENTLY PREGNANT IN SECOND TRIMESTER: Status: ACTIVE | Noted: 2024-04-12

## 2024-07-15 PROBLEM — Z3A.26 26 WEEKS GESTATION OF PREGNANCY: Status: ACTIVE | Noted: 2024-03-22

## 2024-07-15 NOTE — PATIENT INSTRUCTIONS
Patient Education     Pregnancy - The Sixth Month   About this topic   It is important for you to learn how to take care of yourself to help you have a healthy baby and safe delivery. It is good to have health care throughout your pregnancy.  The sixth month of your pregnancy starts around week 24 and lasts through week 28. By knowing how far along you are, you can learn what is normal for this stage of your pregnancy and plan for what is next.  General   Your body   During the sixth month of your pregnancy, here are some things you can expect.  You may:  Start to gain a little more weight. It is normal to gain about 10 to 15 pounds (4.5 to 7 kg) total in your first 6 months.  Have problems like back pain, dry eyes, or aching hands and wrists  Itching of palms, abdomen, or soles of your feet  Swelling of ankles, fingers, or face.  Need to urinate more frequently.  Have problems with hemorrhoids. Be sure to eat plenty of fresh fruits and vegetables to increase the fiber in your diet. Drink plenty of water to help keep your stools soft.  Continue having Lam Velez contractions. You may feel your belly squeezing or getting tight.  Have a glucose test to test for gestational diabetes.  Have more headaches. Talk to your doctor about how to help with them.  See stretch marks on your belly, breasts, or legs. Stay active to try and keep good muscle tone.  See an increase in vaginal discharge. Talk to your doctor about what to expect.  Your baby's growth and development:  Your baby looks like a very small  baby.  They are able to live outside of your womb but would need a lot of help breathing, eating, and staying warm in an intensive care unit.  Your baby has times of being awake and times of being asleep. The baby’s eyelids are able to open and close.  They move more and have hiccups.  Your baby is about 14 inches (36 cm) long and weighs about 2 pounds (900 gm). Your baby is about the size of an eggplant.  Baby  may be able to hear voices.  Things to Think About   Avoid alcohol, drugs, tobacco products, and second hand smoke  Eat small meals throughout the day instead of larger meals.  Avoid spicy, greasy, or fatty foods.  Avoid lying down or bending over for around 3 hours after eating  Warm baths are fine to help you relax. Avoid hot tubs while you are pregnant.  Avoid straining when having bowel movements.  Learning how to care for your baby. You may want to sign up for classes on how to take care of a .  Are you planning on going back to work after having your baby? It’s not too early to think about .  Consider starting your birth plan if you have specific needs or wishes for delivery.  When do I need to call the doctor?   Contractions every 10 minutes or more often that do not go away with drinking water or position changes  Low, dull back pain that does not go away  Pressure in your pelvis that feels like your baby is pushing down  A gush or constant trickle of watery or bloody fluid leaking from your vagina  Cramps in your lower belly that come and go or are constant  Change in your baby's movement  Fever of 100.4°F (38°C) or higher  Little to no movement felt by baby in 2 hours. Your baby should be moving at least 10 times every 2 hours.  Headache that does not go away; blurry vision; seeing spots or halos; increase in swelling in your hands, feet, or face; and pain under your ribs on the right side  Vaginal bleeding with or without pain  After a car accident, fall, or any trauma to your belly  Having thoughts of harming yourself or others, or do not feel safe at home  Last Reviewed Date   2020  Consumer Information Use and Disclaimer   This generalized information is a limited summary of diagnosis, treatment, and/or medication information. It is not meant to be comprehensive and should be used as a tool to help the user understand and/or assess potential diagnostic and treatment options. It does  NOT include all information about conditions, treatments, medications, side effects, or risks that may apply to a specific patient. It is not intended to be medical advice or a substitute for the medical advice, diagnosis, or treatment of a health care provider based on the health care provider's examination and assessment of a patient’s specific and unique circumstances. Patients must speak with a health care provider for complete information about their health, medical questions, and treatment options, including any risks or benefits regarding use of medications. This information does not endorse any treatments or medications as safe, effective, or approved for treating a specific patient. [employer] and its affiliates disclaim any warranty or liability relating to this information or the use thereof. The use of this information is governed by the Terms of Use, available at https://www.woltersDailyDigitaluwer.com/en/know/clinical-effectiveness-terms   Copyright   Copyright © 2024 [employer] and its affiliates and/or licensors. All rights reserved.

## 2024-07-16 ENCOUNTER — ROUTINE PRENATAL (OUTPATIENT)
Age: 44
End: 2024-07-16
Payer: COMMERCIAL

## 2024-07-16 VITALS
SYSTOLIC BLOOD PRESSURE: 110 MMHG | BODY MASS INDEX: 33.81 KG/M2 | WEIGHT: 197 LBS | HEART RATE: 82 BPM | DIASTOLIC BLOOD PRESSURE: 78 MMHG

## 2024-07-16 DIAGNOSIS — Z34.82 ENCOUNTER FOR SUPERVISION OF OTHER NORMAL PREGNANCY, SECOND TRIMESTER: Primary | ICD-10-CM

## 2024-07-16 DIAGNOSIS — O09.292 PRIOR PREGNANCY WITH PLACENTA ABRUPTION IN SECOND TRIMESTER, ANTEPARTUM: ICD-10-CM

## 2024-07-16 DIAGNOSIS — A60.9 HSV (HERPES SIMPLEX VIRUS) ANOGENITAL INFECTION: ICD-10-CM

## 2024-07-16 DIAGNOSIS — O09.292 HISTORY OF PRIOR PREGNANCY WITH SHORT CERVIX, CURRENTLY PREGNANT IN SECOND TRIMESTER: ICD-10-CM

## 2024-07-16 DIAGNOSIS — Z3A.26 26 WEEKS GESTATION OF PREGNANCY: ICD-10-CM

## 2024-07-16 DIAGNOSIS — O09.522 MULTIGRAVIDA OF ADVANCED MATERNAL AGE IN SECOND TRIMESTER: ICD-10-CM

## 2024-07-16 LAB
SL AMB  POCT GLUCOSE, UA: NORMAL
SL AMB POCT URINE PROTEIN: NORMAL

## 2024-07-16 PROCEDURE — 81002 URINALYSIS NONAUTO W/O SCOPE: CPT | Performed by: NURSE PRACTITIONER

## 2024-07-16 PROCEDURE — 99213 OFFICE O/P EST LOW 20 MIN: CPT | Performed by: NURSE PRACTITIONER

## 2024-07-16 NOTE — PROGRESS NOTES
Problem   26 Weeks Gestation of Pregnancy    Blood Type:  A+    Antibody screen: neg  Pap done 4/25  GC/CT -  neg  PN1 Labs- done  28 Week Labs- ordered 7/16    Blue folder- reviewed  Yellow folder- to be reviewed    MatT21 neg  AFP- negative  Level 1 5/9  Level 2- 6/6  FG on 8/1 at 28 weeks    TDAP -     Delivery consent-  Breast pump -   Pediatrician -  L&D forms-    Perineal massage -  GBS swab -   IOL -             26 weeks gestation of pregnancy  Feels well other than complaints of more discharge. She feels she may have passed brown mucus 10 days ago. Denies LOF/CTX/VB. Exam c/w normal physiologic pregnancy discharge. Ph 4.5. Negative Vasalva. Discussed fetal awareness. No other concerns. Advised to increase fluids on the warmer days. Overview charting updated today.

## 2024-07-16 NOTE — ASSESSMENT & PLAN NOTE
Feels well other than complaints of more discharge. She feels she may have passed brown mucus 10 days ago. Denies LOF/CTX/VB. Exam c/w normal physiologic pregnancy discharge. Ph 4.5. Negative Vasalva. Discussed fetal awareness. No other concerns. Advised to increase fluids on the warmer days. Overview charting updated today.

## 2024-07-16 NOTE — PROGRESS NOTES
Pt is feeling fetal movement . Has more discharge . On 7/6/2024 had thicker discharge with tinge of blood that resembled a Mucus plug. Has pelvic pain.

## 2024-07-31 PROBLEM — O09.293 PRIOR PREGNANCY WITH PLACENTA ABRUPTION IN THIRD TRIMESTER, ANTEPARTUM: Status: ACTIVE | Noted: 2024-04-12

## 2024-07-31 PROBLEM — O09.523 MULTIGRAVIDA OF ADVANCED MATERNAL AGE IN THIRD TRIMESTER: Status: ACTIVE | Noted: 2024-04-12

## 2024-07-31 PROBLEM — O09.293 HISTORY OF PRIOR PREGNANCY WITH SHORT CERVIX, CURRENTLY PREGNANT IN THIRD TRIMESTER: Status: ACTIVE | Noted: 2024-04-12

## 2024-08-01 ENCOUNTER — ULTRASOUND (OUTPATIENT)
Dept: PERINATAL CARE | Facility: OTHER | Age: 44
End: 2024-08-01
Payer: COMMERCIAL

## 2024-08-01 VITALS
DIASTOLIC BLOOD PRESSURE: 60 MMHG | HEIGHT: 65 IN | SYSTOLIC BLOOD PRESSURE: 94 MMHG | BODY MASS INDEX: 32.82 KG/M2 | WEIGHT: 197 LBS | HEART RATE: 76 BPM

## 2024-08-01 DIAGNOSIS — O09.523 MULTIGRAVIDA OF ADVANCED MATERNAL AGE IN THIRD TRIMESTER: ICD-10-CM

## 2024-08-01 DIAGNOSIS — Z3A.28 28 WEEKS GESTATION OF PREGNANCY: Primary | ICD-10-CM

## 2024-08-01 DIAGNOSIS — O09.293 PRIOR PREGNANCY WITH PLACENTA ABRUPTION IN THIRD TRIMESTER, ANTEPARTUM: ICD-10-CM

## 2024-08-01 DIAGNOSIS — Z36.89 ENCOUNTER FOR ULTRASOUND TO ASSESS FETAL GROWTH: ICD-10-CM

## 2024-08-01 DIAGNOSIS — O09.293 HISTORY OF PRIOR PREGNANCY WITH SHORT CERVIX, CURRENTLY PREGNANT IN THIRD TRIMESTER: ICD-10-CM

## 2024-08-01 PROCEDURE — 76816 OB US FOLLOW-UP PER FETUS: CPT | Performed by: OBSTETRICS & GYNECOLOGY

## 2024-08-01 PROCEDURE — 99213 OFFICE O/P EST LOW 20 MIN: CPT | Performed by: NURSE PRACTITIONER

## 2024-08-01 NOTE — LETTER
"  Date: 2024    SUBHASH Ferreira  [address] [facility] [address]    Patient: Jolie Garcia   YOB: 1980   Date of Visit: 2024   Gestational age 28w3d   Nature of this communication: Routine       This patient was seen recently in our  office.  Please see ultrasound report under \"OB Procedures\" tab.  Please don't hesitate to contact our office with any concerns or questions.      Sincerely,      Gala Tay MD  Attending Physician, Maternal-Fetal Medicine  [facility]       "

## 2024-08-01 NOTE — PROGRESS NOTES
"[facility]: Jolie Garcia was seen today at 28w3d for fetal growth assessment ultrasound.  See ultrasound report under \"OB Procedures\" tab.  Please don't hesitate to contact our office with any concerns or questions.  -Gala Tay MD    "

## 2024-08-07 ENCOUNTER — APPOINTMENT (OUTPATIENT)
Dept: LAB | Facility: CLINIC | Age: 44
End: 2024-08-07
Payer: COMMERCIAL

## 2024-08-07 DIAGNOSIS — Z34.82 ENCOUNTER FOR SUPERVISION OF OTHER NORMAL PREGNANCY, SECOND TRIMESTER: ICD-10-CM

## 2024-08-07 LAB
BASOPHILS # BLD AUTO: 0.04 THOUSANDS/ÂΜL (ref 0–0.1)
BASOPHILS NFR BLD AUTO: 0 % (ref 0–1)
EOSINOPHIL # BLD AUTO: 0.07 THOUSAND/ÂΜL (ref 0–0.61)
EOSINOPHIL NFR BLD AUTO: 1 % (ref 0–6)
ERYTHROCYTE [DISTWIDTH] IN BLOOD BY AUTOMATED COUNT: 13.1 % (ref 11.6–15.1)
FERRITIN SERPL-MCNC: 5 NG/ML (ref 11–307)
GLUCOSE 1H P 50 G GLC PO SERPL-MCNC: 117 MG/DL (ref 70–134)
HCT VFR BLD AUTO: 30.9 % (ref 34.8–46.1)
HGB BLD-MCNC: 10.1 G/DL (ref 11.5–15.4)
IMM GRANULOCYTES # BLD AUTO: 0.14 THOUSAND/UL (ref 0–0.2)
IMM GRANULOCYTES NFR BLD AUTO: 1 % (ref 0–2)
LYMPHOCYTES # BLD AUTO: 2 THOUSANDS/ÂΜL (ref 0.6–4.47)
LYMPHOCYTES NFR BLD AUTO: 19 % (ref 14–44)
MCH RBC QN AUTO: 29.4 PG (ref 26.8–34.3)
MCHC RBC AUTO-ENTMCNC: 32.7 G/DL (ref 31.4–37.4)
MCV RBC AUTO: 90 FL (ref 82–98)
MONOCYTES # BLD AUTO: 0.82 THOUSAND/ÂΜL (ref 0.17–1.22)
MONOCYTES NFR BLD AUTO: 8 % (ref 4–12)
NEUTROPHILS # BLD AUTO: 7.33 THOUSANDS/ÂΜL (ref 1.85–7.62)
NEUTS SEG NFR BLD AUTO: 71 % (ref 43–75)
NRBC BLD AUTO-RTO: 0 /100 WBCS
PLATELET # BLD AUTO: 271 THOUSANDS/UL (ref 149–390)
PMV BLD AUTO: 8.7 FL (ref 8.9–12.7)
RBC # BLD AUTO: 3.43 MILLION/UL (ref 3.81–5.12)
WBC # BLD AUTO: 10.4 THOUSAND/UL (ref 4.31–10.16)

## 2024-08-07 PROCEDURE — 82950 GLUCOSE TEST: CPT

## 2024-08-07 PROCEDURE — 85025 COMPLETE CBC W/AUTO DIFF WBC: CPT

## 2024-08-07 PROCEDURE — 82728 ASSAY OF FERRITIN: CPT

## 2024-08-07 PROCEDURE — 86780 TREPONEMA PALLIDUM: CPT

## 2024-08-07 PROCEDURE — 36415 COLL VENOUS BLD VENIPUNCTURE: CPT

## 2024-08-08 ENCOUNTER — TELEPHONE (OUTPATIENT)
Dept: OBGYN CLINIC | Facility: CLINIC | Age: 44
End: 2024-08-08

## 2024-08-08 DIAGNOSIS — O99.013 ANEMIA IN PREGNANCY, THIRD TRIMESTER: Primary | ICD-10-CM

## 2024-08-08 LAB — TREPONEMA PALLIDUM IGG+IGM AB [PRESENCE] IN SERUM OR PLASMA BY IMMUNOASSAY: NORMAL

## 2024-08-08 NOTE — TELEPHONE ENCOUNTER
----- Message from Bessy Oseguera MD sent at 8/8/2024  2:17 PM EDT -----  Please call Jolie to let her know that her CBC showed anemia with definite evidence of iron deficiency.    She should begin oral iron and vitamin C and then repeat the CBC in 4 weeks to ensure appropriate rise.  If not, she will need IV iron infusions.      Her other labs were normal.    Thanks!!

## 2024-08-13 ENCOUNTER — ROUTINE PRENATAL (OUTPATIENT)
Age: 44
End: 2024-08-13
Payer: COMMERCIAL

## 2024-08-13 VITALS
SYSTOLIC BLOOD PRESSURE: 108 MMHG | TEMPERATURE: 97.8 F | DIASTOLIC BLOOD PRESSURE: 78 MMHG | BODY MASS INDEX: 33.71 KG/M2 | WEIGHT: 201 LBS | HEART RATE: 73 BPM

## 2024-08-13 DIAGNOSIS — K59.00 CONSTIPATION, UNSPECIFIED CONSTIPATION TYPE: ICD-10-CM

## 2024-08-13 DIAGNOSIS — O09.293: ICD-10-CM

## 2024-08-13 DIAGNOSIS — A60.9 HSV (HERPES SIMPLEX VIRUS) ANOGENITAL INFECTION: ICD-10-CM

## 2024-08-13 DIAGNOSIS — O09.523 MULTIGRAVIDA OF ADVANCED MATERNAL AGE IN THIRD TRIMESTER: ICD-10-CM

## 2024-08-13 DIAGNOSIS — Z34.83 ENCOUNTER FOR SUPERVISION OF OTHER NORMAL PREGNANCY IN THIRD TRIMESTER: Primary | ICD-10-CM

## 2024-08-13 DIAGNOSIS — Z3A.30 30 WEEKS GESTATION OF PREGNANCY: ICD-10-CM

## 2024-08-13 DIAGNOSIS — O09.293 HISTORY OF PRIOR PREGNANCY WITH SHORT CERVIX, CURRENTLY PREGNANT IN THIRD TRIMESTER: ICD-10-CM

## 2024-08-13 DIAGNOSIS — O99.013 ANEMIA IN PREGNANCY, THIRD TRIMESTER: ICD-10-CM

## 2024-08-13 DIAGNOSIS — O09.293 PRIOR PREGNANCY WITH PLACENTA ABRUPTION IN THIRD TRIMESTER, ANTEPARTUM: ICD-10-CM

## 2024-08-13 LAB
SL AMB  POCT GLUCOSE, UA: NORMAL
SL AMB POCT URINE PROTEIN: NORMAL

## 2024-08-13 PROCEDURE — 81002 URINALYSIS NONAUTO W/O SCOPE: CPT | Performed by: NURSE PRACTITIONER

## 2024-08-13 PROCEDURE — 99213 OFFICE O/P EST LOW 20 MIN: CPT | Performed by: NURSE PRACTITIONER

## 2024-08-13 RX ORDER — FERROUS SULFATE 325(65) MG
325 TABLET, DELAYED RELEASE (ENTERIC COATED) ORAL
COMMUNITY

## 2024-08-13 NOTE — PATIENT INSTRUCTIONS
Patient Education     Pregnancy - The Seventh Month   About this topic   It is important for you to learn how to take care of yourself to help you have a healthy baby and safe delivery. It is good to have health care throughout your pregnancy.  The seventh month of your pregnancy starts around week 29 and lasts through week 32. By knowing how far along you are, you can learn what is normal for this stage of your pregnancy and plan for what is next.  General   Your body   During the seventh month of your pregnancy, here are some things you can expect.  You may:  Have weight gain of about 15 to 20 pounds (6.8 to 9 kg) total in your first 7 months.  Have more trouble moving about and sleeping as the baby gets bigger  Have very vivid dreams  Notice more vaginal discharge  Notice a creamy, watery substance leaking from your nipples  Need a shot called Rh immune globulin if your blood type may be different from your baby's  Your baby's growth and development:  Your baby is gaining weight and adding layers of fat.  Your baby turns to be head down, into the position for delivery.  They are able to respond to loud noises and to dream.  Your baby moves at least 10 times in 2 hours. If your baby isn't moving this much, talk to your doctor right away.  Your baby is about 16 inches (42 cm) long and weighs about 4 pounds (1,800 gm). Your baby is about the size of squash.  Things to Think About   Avoid alcohol, drugs, tobacco products, and second hand smoke  Think about what you want your baby's birth to be like. Who do you want with you?  Find out about what lactation services are covered by your insurance.  Look into lactation consultants and breastfeeding classes.  Where do you want to deliver? It’s time to make a plan for labor and delivery.  Plan on getting a car seat and other things for your baby.  Talk to your doctor if you plan to travel or get on a plane.  When do I need to call the doctor?   Contractions every 10  minutes or more often that do not go away with drinking water or position changes.  Low, dull back pain that does not go away  Feeling unusually dizzy or tired  Pressure in your pelvis that feels like your baby is pushing down  A gush or constant trickle of watery or bloody fluid leaking from your vagina  Cramps in your lower belly that come and go or are constant  Little to no movement felt by baby in 2 hours. Your baby should be moving at least 10 times every 2 hours.  Headache that does not go away; blurry vision; seeing spots or halos; increase in swelling in your hands, feet, or face; and pain under your ribs on the right side  Vaginal bleeding with or without pain  Fever of 100.4°F (38°C) or higher  After a car accident, fall, or any trauma to your belly  Having thoughts of harming yourself or others, or do not feel safe at home  Last Reviewed Date   2020-05-06  Consumer Information Use and Disclaimer   This generalized information is a limited summary of diagnosis, treatment, and/or medication information. It is not meant to be comprehensive and should be used as a tool to help the user understand and/or assess potential diagnostic and treatment options. It does NOT include all information about conditions, treatments, medications, side effects, or risks that may apply to a specific patient. It is not intended to be medical advice or a substitute for the medical advice, diagnosis, or treatment of a health care provider based on the health care provider's examination and assessment of a patient’s specific and unique circumstances. Patients must speak with a health care provider for complete information about their health, medical questions, and treatment options, including any risks or benefits regarding use of medications. This information does not endorse any treatments or medications as safe, effective, or approved for treating a specific patient. UpTo[employer], [employer]. and its affiliates disclaim any warranty or  liability relating to this information or the use thereof. The use of this information is governed by the Terms of Use, available at https://www.wolterskluwer.com/en/know/clinical-effectiveness-terms   Copyright   Copyright © 2024 [employer] and its affiliates and/or licensors. All rights reserved.

## 2024-08-13 NOTE — PROGRESS NOTES
Problem   30 Weeks Gestation of Pregnancy    Blood Type:  A+    Antibody screen: neg  Pap done 4/25  GC/CT -  neg  PN1 Labs- done  28 Week Labs- anemia, on iron, recheck in September    Blue folder- reviewed  Yellow folder- to be reviewed    MatT21 neg  AFP- negative  Level 1 5/9  Level 2- 6/6  FG on 8/1 at 28 weeks  FG on 9/12 at 34 wks    TDAP - declined    Delivery consent- signed  Breast pump - has  Pediatrician - has  L&D forms-     Perineal massage -  GBS swab -   IOL -             30 weeks gestation of pregnancy  Feels well. Denies LOF/CTX/VB. Discussed fetal awareness. No concerns.  Yellow folder given today.  Patient has hemorrhoids which have started to bother her since starting her iron therapy.  She will work on staying soft and increasing her fluids since her urine was very concentrated today.  Overview charting updated today.

## 2024-08-15 ENCOUNTER — TELEPHONE (OUTPATIENT)
Dept: OBGYN CLINIC | Facility: CLINIC | Age: 44
End: 2024-08-15

## 2024-08-15 NOTE — TELEPHONE ENCOUNTER
----- Message from Jeana CUMMINGS sent at 4/16/2024  3:00 PM EDT -----  Regarding: 3rd Trimester Call Due  8/12-9/9

## 2024-08-21 PROBLEM — Z3A.32 32 WEEKS GESTATION OF PREGNANCY: Status: ACTIVE | Noted: 2024-03-22

## 2024-08-21 PROBLEM — Z3A.31 31 WEEKS GESTATION OF PREGNANCY: Status: ACTIVE | Noted: 2024-03-22

## 2024-08-21 NOTE — PATIENT INSTRUCTIONS
Patient Education     Pregnancy - The Seventh Month   About this topic   It is important for you to learn how to take care of yourself to help you have a healthy baby and safe delivery. It is good to have health care throughout your pregnancy.  The seventh month of your pregnancy starts around week 29 and lasts through week 32. By knowing how far along you are, you can learn what is normal for this stage of your pregnancy and plan for what is next.  General   Your body   During the seventh month of your pregnancy, here are some things you can expect.  You may:  Have weight gain of about 15 to 20 pounds (6.8 to 9 kg) total in your first 7 months.  Have more trouble moving about and sleeping as the baby gets bigger  Have very vivid dreams  Notice more vaginal discharge  Notice a creamy, watery substance leaking from your nipples  Need a shot called Rh immune globulin if your blood type may be different from your baby's  Your baby's growth and development:  Your baby is gaining weight and adding layers of fat.  Your baby turns to be head down, into the position for delivery.  They are able to respond to loud noises and to dream.  Your baby moves at least 10 times in 2 hours. If your baby isn't moving this much, talk to your doctor right away.  Your baby is about 16 inches (42 cm) long and weighs about 4 pounds (1,800 gm). Your baby is about the size of squash.  Things to Think About   Avoid alcohol, drugs, tobacco products, and second hand smoke  Think about what you want your baby's birth to be like. Who do you want with you?  Find out about what lactation services are covered by your insurance.  Look into lactation consultants and breastfeeding classes.  Where do you want to deliver? It’s time to make a plan for labor and delivery.  Plan on getting a car seat and other things for your baby.  Talk to your doctor if you plan to travel or get on a plane.  When do I need to call the doctor?   Contractions every 10  minutes or more often that do not go away with drinking water or position changes.  Low, dull back pain that does not go away  Feeling unusually dizzy or tired  Pressure in your pelvis that feels like your baby is pushing down  A gush or constant trickle of watery or bloody fluid leaking from your vagina  Cramps in your lower belly that come and go or are constant  Little to no movement felt by baby in 2 hours. Your baby should be moving at least 10 times every 2 hours.  Headache that does not go away; blurry vision; seeing spots or halos; increase in swelling in your hands, feet, or face; and pain under your ribs on the right side  Vaginal bleeding with or without pain  Fever of 100.4°F (38°C) or higher  After a car accident, fall, or any trauma to your belly  Having thoughts of harming yourself or others, or do not feel safe at home  Last Reviewed Date   2020-05-06  Consumer Information Use and Disclaimer   This generalized information is a limited summary of diagnosis, treatment, and/or medication information. It is not meant to be comprehensive and should be used as a tool to help the user understand and/or assess potential diagnostic and treatment options. It does NOT include all information about conditions, treatments, medications, side effects, or risks that may apply to a specific patient. It is not intended to be medical advice or a substitute for the medical advice, diagnosis, or treatment of a health care provider based on the health care provider's examination and assessment of a patient’s specific and unique circumstances. Patients must speak with a health care provider for complete information about their health, medical questions, and treatment options, including any risks or benefits regarding use of medications. This information does not endorse any treatments or medications as safe, effective, or approved for treating a specific patient. UpToDate, Inc. and its affiliates disclaim any warranty or  liability relating to this information or the use thereof. The use of this information is governed by the Terms of Use, available at https://www.wolterskluwer.com/en/know/clinical-effectiveness-terms   Copyright   Copyright © 2024 [employer] and its affiliates and/or licensors. All rights reserved.

## 2024-08-27 ENCOUNTER — ROUTINE PRENATAL (OUTPATIENT)
Age: 44
End: 2024-08-27
Payer: COMMERCIAL

## 2024-08-27 VITALS
SYSTOLIC BLOOD PRESSURE: 108 MMHG | TEMPERATURE: 98 F | HEART RATE: 77 BPM | DIASTOLIC BLOOD PRESSURE: 78 MMHG | WEIGHT: 205 LBS | BODY MASS INDEX: 34.38 KG/M2

## 2024-08-27 DIAGNOSIS — O09.523 MULTIGRAVIDA OF ADVANCED MATERNAL AGE IN THIRD TRIMESTER: ICD-10-CM

## 2024-08-27 DIAGNOSIS — A60.9 HSV (HERPES SIMPLEX VIRUS) ANOGENITAL INFECTION: ICD-10-CM

## 2024-08-27 DIAGNOSIS — O09.293 HISTORY OF PRIOR PREGNANCY WITH SHORT CERVIX, CURRENTLY PREGNANT IN THIRD TRIMESTER: ICD-10-CM

## 2024-08-27 DIAGNOSIS — O09.293 PRIOR PREGNANCY WITH PLACENTA ABRUPTION IN THIRD TRIMESTER, ANTEPARTUM: ICD-10-CM

## 2024-08-27 DIAGNOSIS — Z34.83 ENCOUNTER FOR SUPERVISION OF OTHER NORMAL PREGNANCY IN THIRD TRIMESTER: Primary | ICD-10-CM

## 2024-08-27 DIAGNOSIS — O99.013 ANEMIA IN PREGNANCY, THIRD TRIMESTER: ICD-10-CM

## 2024-08-27 DIAGNOSIS — Z3A.32 32 WEEKS GESTATION OF PREGNANCY: ICD-10-CM

## 2024-08-27 LAB
SL AMB  POCT GLUCOSE, UA: NORMAL
SL AMB POCT URINE PROTEIN: NORMAL

## 2024-08-27 PROCEDURE — 81002 URINALYSIS NONAUTO W/O SCOPE: CPT | Performed by: NURSE PRACTITIONER

## 2024-08-27 PROCEDURE — 99213 OFFICE O/P EST LOW 20 MIN: CPT | Performed by: NURSE PRACTITIONER

## 2024-08-27 RX ORDER — VALACYCLOVIR HYDROCHLORIDE 500 MG/1
500 TABLET, FILM COATED ORAL 2 TIMES DAILY
Qty: 60 TABLET | Refills: 1 | Status: SHIPPED | OUTPATIENT
Start: 2024-08-27 | End: 2024-09-26

## 2024-08-27 NOTE — ASSESSMENT & PLAN NOTE
She feels well. She denies LOF/CTX/VB. She did not have any concerns today. We discussed fetal kick counting.   Advised to increase fluids on these warmer days.  She is currently on valtrex for an outbreak. Advised tostay on if recurs, at 36 wks BID until delivery. Pt verbalized understanding.    Overview charting updated today.

## 2024-08-27 NOTE — PROGRESS NOTES
Problem   32 Weeks Gestation of Pregnancy    Blood Type:  A+    Antibody screen: neg  Pap done 4/25  GC/CT -  neg  PN1 Labs- done  28 Week Labs- anemia, on iron, recheck in September (labs ordered)    Blue folder- reviewed  Yellow folder- reviewed    MatT21 neg  AFP- negative  Level 1 5/9  Level 2- 6/6  FG on 8/1 at 28 weeks  FG on 9/12 at 34 wks  NSTs with JAMES weekly    TDAP - declined    Delivery consent- signed  Breast pump - has  Pediatrician - has  L&D forms- submitted today    Perineal massage -  GBS swab -   IOL -             32 weeks gestation of pregnancy  She feels well. She denies LOF/CTX/VB. She did not have any concerns today. We discussed fetal kick counting.   Advised to increase fluids on these warmer days.  She is currently on valtrex for an outbreak. Advised tostay on if recurs, at 36 wks BID until delivery. Pt verbalized understanding.    Overview charting updated today.

## 2024-09-05 PROBLEM — Z3A.34 34 WEEKS GESTATION OF PREGNANCY: Status: ACTIVE | Noted: 2024-03-22

## 2024-09-05 NOTE — PATIENT INSTRUCTIONS
Patient Education     Pregnancy - The Eighth Month   About this topic   It is important for you to learn how to take care of yourself to help you have a healthy baby and safe delivery. It is good to have health care throughout your pregnancy.  The eighth month of your pregnancy starts around week 33 and lasts through week 36. By knowing how far along you are, you can learn what is normal for this stage of your pregnancy and plan for what is next.  General   Your body   During the eighth month of your pregnancy, here are some things you can expect.  You may:  Be feeling more tired. Rest as much as you can and take small naps if possible. This also helps with swollen feet and ankles.  Feel hot all the time. Be sure to drink plenty of water.  Notice your baby drops more into your pelvis. This makes breathing a bit easier, but you may have to go to the bathroom more often. You may also notice more problems with hemorrhoids.  Have more back pain  Notice clear jelly-like substance flecked with blood when you use the restroom. This is your mucus plug.  Feel less steady on your feet. This is because your hips and joints are preparing for birth.  Be tested for Group Beta Strep (GBS) to see if you will need antibiotics during labor  Gain about 1/2 to 1 pound (.23 to .45 kg) a week for the rest of your pregnancy. It is normal to gain about 5 to 10 pounds (2.3 to 4.5 kg) total in your first 4 months.  Have a BPP, or Biophysical Profile. The doctors do an ultrasound to check your baby’s health if you are at high risk or having problems.  Have a NST, or Non Stress Test. The staff place special monitors around your belly to check the baby’s heart rate and look for contractions.  Your baby's growth and development:  Your baby is almost fully mature but will spend the rest of the time inside of you getting bigger.  Their lungs are the last organ to mature, so it is important that your baby stays in your womb until your due date if  possible.  Their bones are getting stronger each day. The bones in their skull stay a little softer to make delivery easier.  They are able to scratch themselves as their fingernails are developed.  Your baby is becoming protected from germs as they develop antibodies.  They are moving a little less because there is less room.  Your baby is about 19 inches (48 cm) long and weighs about 6 pounds (2,700 gm). Your baby is about the size of a papaya or pineapple.  Things to Think About   Avoid alcohol, drugs, tobacco products, and second hand smoke.  Be sure you know the signs of labor and when to call your doctor.  Are you planning a natural childbirth or thinking about an epidural? Know things you can do to help cope with labor pain.  You may want to learn about cord blood banking.  Do you have everything you need for after the baby is born? Be sure the car seat fits in the car.  When do I need to call the doctor?   Contractions every 10 minutes or more often that do not go away with drinking water or position changes  Headache that does not go away; blurry vision; seeing spots or halos; increase in swelling in your hands, feet, or face; and pain under your ribs on the right side  Low, dull back pain that does not go away  Pressure in your pelvis that feels like your baby is pushing down  A gush or constant trickle of watery or bloody fluid leaking from your vagina  Little to no movement felt by baby in 2 hours. Your baby should be moving at least 10 times every 2 hours.  Vaginal bleeding with or without pain  Fever of 100.4°F (38°C) or higher  After a car accident, fall, or any trauma to your belly  Having thoughts of harming yourself or others, or do not feel safe at home  Last Reviewed Date   2020-05-06  Consumer Information Use and Disclaimer   This generalized information is a limited summary of diagnosis, treatment, and/or medication information. It is not meant to be comprehensive and should be used as a tool  to help the user understand and/or assess potential diagnostic and treatment options. It does NOT include all information about conditions, treatments, medications, side effects, or risks that may apply to a specific patient. It is not intended to be medical advice or a substitute for the medical advice, diagnosis, or treatment of a health care provider based on the health care provider's examination and assessment of a patient’s specific and unique circumstances. Patients must speak with a health care provider for complete information about their health, medical questions, and treatment options, including any risks or benefits regarding use of medications. This information does not endorse any treatments or medications as safe, effective, or approved for treating a specific patient. UpToDate, Inc. and its affiliates disclaim any warranty or liability relating to this information or the use thereof. The use of this information is governed by the Terms of Use, available at https://www.[employer].com/en/know/clinical-effectiveness-terms   Copyright   Copyright © 2024 UpToDate, Inc. and its affiliates and/or licensors. All rights reserved.

## 2024-09-09 ENCOUNTER — APPOINTMENT (OUTPATIENT)
Dept: LAB | Facility: CLINIC | Age: 44
End: 2024-09-09
Payer: COMMERCIAL

## 2024-09-09 DIAGNOSIS — O99.013 ANEMIA IN PREGNANCY, THIRD TRIMESTER: ICD-10-CM

## 2024-09-09 LAB
BASOPHILS # BLD AUTO: 0.02 THOUSANDS/ÂΜL (ref 0–0.1)
BASOPHILS NFR BLD AUTO: 0 % (ref 0–1)
EOSINOPHIL # BLD AUTO: 0.08 THOUSAND/ÂΜL (ref 0–0.61)
EOSINOPHIL NFR BLD AUTO: 1 % (ref 0–6)
ERYTHROCYTE [DISTWIDTH] IN BLOOD BY AUTOMATED COUNT: 15.1 % (ref 11.6–15.1)
FERRITIN SERPL-MCNC: 23 NG/ML (ref 11–307)
HCT VFR BLD AUTO: 33 % (ref 34.8–46.1)
HGB BLD-MCNC: 10.5 G/DL (ref 11.5–15.4)
IMM GRANULOCYTES # BLD AUTO: 0.19 THOUSAND/UL (ref 0–0.2)
IMM GRANULOCYTES NFR BLD AUTO: 2 % (ref 0–2)
LYMPHOCYTES # BLD AUTO: 1.87 THOUSANDS/ÂΜL (ref 0.6–4.47)
LYMPHOCYTES NFR BLD AUTO: 18 % (ref 14–44)
MCH RBC QN AUTO: 29.6 PG (ref 26.8–34.3)
MCHC RBC AUTO-ENTMCNC: 31.8 G/DL (ref 31.4–37.4)
MCV RBC AUTO: 93 FL (ref 82–98)
MONOCYTES # BLD AUTO: 0.8 THOUSAND/ÂΜL (ref 0.17–1.22)
MONOCYTES NFR BLD AUTO: 8 % (ref 4–12)
NEUTROPHILS # BLD AUTO: 7.32 THOUSANDS/ÂΜL (ref 1.85–7.62)
NEUTS SEG NFR BLD AUTO: 71 % (ref 43–75)
NRBC BLD AUTO-RTO: 0 /100 WBCS
PLATELET # BLD AUTO: 261 THOUSANDS/UL (ref 149–390)
PMV BLD AUTO: 8.8 FL (ref 8.9–12.7)
RBC # BLD AUTO: 3.55 MILLION/UL (ref 3.81–5.12)
WBC # BLD AUTO: 10.28 THOUSAND/UL (ref 4.31–10.16)

## 2024-09-09 PROCEDURE — 82728 ASSAY OF FERRITIN: CPT

## 2024-09-09 PROCEDURE — 85025 COMPLETE CBC W/AUTO DIFF WBC: CPT

## 2024-09-09 PROCEDURE — 36415 COLL VENOUS BLD VENIPUNCTURE: CPT

## 2024-09-10 ENCOUNTER — ROUTINE PRENATAL (OUTPATIENT)
Age: 44
End: 2024-09-10
Payer: COMMERCIAL

## 2024-09-10 VITALS
SYSTOLIC BLOOD PRESSURE: 108 MMHG | BODY MASS INDEX: 34.21 KG/M2 | HEART RATE: 98 BPM | DIASTOLIC BLOOD PRESSURE: 78 MMHG | WEIGHT: 204 LBS

## 2024-09-10 DIAGNOSIS — O99.013 ANEMIA IN PREGNANCY, THIRD TRIMESTER: ICD-10-CM

## 2024-09-10 DIAGNOSIS — A60.9 HSV (HERPES SIMPLEX VIRUS) ANOGENITAL INFECTION: ICD-10-CM

## 2024-09-10 DIAGNOSIS — O09.523 MULTIGRAVIDA OF ADVANCED MATERNAL AGE IN THIRD TRIMESTER: ICD-10-CM

## 2024-09-10 DIAGNOSIS — Z3A.34 34 WEEKS GESTATION OF PREGNANCY: ICD-10-CM

## 2024-09-10 DIAGNOSIS — O09.293 HISTORY OF PRIOR PREGNANCY WITH SHORT CERVIX, CURRENTLY PREGNANT IN THIRD TRIMESTER: ICD-10-CM

## 2024-09-10 DIAGNOSIS — Z34.83 ENCOUNTER FOR SUPERVISION OF OTHER NORMAL PREGNANCY IN THIRD TRIMESTER: Primary | ICD-10-CM

## 2024-09-10 DIAGNOSIS — O09.293 PRIOR PREGNANCY WITH PLACENTA ABRUPTION IN THIRD TRIMESTER, ANTEPARTUM: ICD-10-CM

## 2024-09-10 LAB
SL AMB  POCT GLUCOSE, UA: NORMAL
SL AMB POCT URINE PROTEIN: NORMAL

## 2024-09-10 PROCEDURE — 99213 OFFICE O/P EST LOW 20 MIN: CPT | Performed by: NURSE PRACTITIONER

## 2024-09-10 PROCEDURE — 81002 URINALYSIS NONAUTO W/O SCOPE: CPT | Performed by: NURSE PRACTITIONER

## 2024-09-10 NOTE — PROGRESS NOTES
Problem   34 Weeks Gestation of Pregnancy    Blood Type:  A+    Antibody screen: neg  Pap done 4/25  GC/CT -  neg  PN1 Labs- done  28 Week Labs- anemia, on iron, recheck done in September (labs still show anemia, improved ferritin, declines infusions for now)    Blue folder- reviewed  Yellow folder- reviewed    MatT21 neg  AFP- negative  Level 1 5/9  Level 2- 6/6  FG on 8/1 at 28 weeks  FG on 9/12 at 34 wks  NSTs with JAMES weekly    TDAP - declined  RSV-?, not available at [place] today    Delivery consent- signed  Breast pump - has  Pediatrician - has  L&D forms- submitted     Perineal massage -  reinforced  GBS swab - next visit  IOL -             34 weeks gestation of pregnancy  She feels well. She denies LOF/CTX/VB. She did not voice any concerns. Discussed fetal kick counting.  She was encouraged to start with her perineal/vaginal massages to prevent lacerations during the labor process.  Discussed iron rich diet.  Overview charting updated today.

## 2024-09-10 NOTE — ASSESSMENT & PLAN NOTE
She feels well. She denies LOF/CTX/VB. She did not voice any concerns. Discussed fetal kick counting.  She was encouraged to start with her perineal/vaginal massages to prevent lacerations during the labor process.  Discussed iron rich diet.  Overview charting updated today.

## 2024-09-10 NOTE — PROGRESS NOTES
Please refer to the [facility] ultrasound report in Ob Procedures for additional information regarding today's visit

## 2024-09-10 NOTE — PATIENT INSTRUCTIONS
"Patient Education     Your baby's movement before birth   The Basics   Written by the doctors and editors at [facility]   When should I start feeling my baby move? -- It depends. Most people first feel their baby moving in the uterus between about 16 and 20 weeks of pregnancy. It might take longer to feel movement if this is your first pregnancy or if the placenta is in the front of your uterus.  What kinds of movements should I feel? -- When you first feel your baby move, it might feel like a gentle flutter in your belly. This is sometimes called \"quickening.\" As the baby grows, their movements will get stronger. You will probably feel them kicking, rolling, and stretching. Later in pregnancy, you might be able to see and feel the baby moving from the outside.  You might notice that your baby is more active at certain times of the day or night. Even before birth, babies have periods of being asleep and awake. When your baby is sleeping, you might notice that they do not move as much.  Should I keep track of my baby's movements? -- If your pregnancy is healthy, you probably do not need to count or record your baby's movements. Feeling regular movement is a good sign that the baby is doing well.  In some cases, your doctor or midwife might ask you to keep track of your baby's movements. If so, they will tell you how to do this and when to call them.  A change in your baby's movements does not always mean that there is a problem. But in some cases, it can be a sign that the baby is having trouble. If your doctor or midwife is concerned, they can do tests to check on the baby.  If I am asked to track movement, how should I do it? -- There are different ways of tracking your baby's movement. This is sometimes called \"kick counting.\"  Your doctor or midwife will tell you exactly what to track. For example, they might ask you to write down:   How long it takes to feel 10 kicks or movements   How many times your baby moves " in 1 hour  Many experts consider at least 10 movements in 2 hours to be a sign that the baby is doing well. But there is no specific cutoff for exactly how much movement is healthy or unhealthy. Some babies are more active than others, and some pregnant people feel movement more easily than others. The main goal of kick counting is to get to know your baby's normal patterns so you can tell if anything changes.  If you are doing kick counting:   Choose a time of day when your baby is usually active.   Find a quiet place where you will not be distracted.   Lie down on your side in a comfortable position.   Check the clock, or set a timer.   Each time you feel your baby move or kick, write down the time. Some people use a smartphone anthony to keep track.   If your baby seems less active than usual, try moving around, eating a snack, and emptying your bladder. This can help wake the baby up if they are asleep.   Stop counting after you have felt 10 kicks, or after the length of time your doctor or midwife told you.  When should I call the doctor? -- Call your doctor or midwife for advice if:   You have concerns about your baby's movement.   Your baby is moving less than they normally do.   You notice a sudden change in the pattern of your baby's movements.   You have any other symptoms that worry you.  All topics are updated as new evidence becomes available and our peer review process is complete.  This topic retrieved from [employer] on: Feb 26, 2024.  Topic 403099 Version 1.0  Release: 32.2.4 - C32.56  © 2024 UpToDate, Inc. and/or its affiliates. All rights reserved.  Consumer Information Use and Disclaimer   Disclaimer: This generalized information is a limited summary of diagnosis, treatment, and/or medication information. It is not meant to be comprehensive and should be used as a tool to help the user understand and/or assess potential diagnostic and treatment options. It does NOT include all information about  conditions, treatments, medications, side effects, or risks that may apply to a specific patient. It is not intended to be medical advice or a substitute for the medical advice, diagnosis, or treatment of a health care provider based on the health care provider's examination and assessment of a patient's specific and unique circumstances. Patients must speak with a health care provider for complete information about their health, medical questions, and treatment options, including any risks or benefits regarding use of medications. This information does not endorse any treatments or medications as safe, effective, or approved for treating a specific patient. UpToDate, Inc. and its affiliates disclaim any warranty or liability relating to this information or the use thereof.The use of this information is governed by the Terms of Use, available at https://www.[employer].com/en/know/clinical-effectiveness-terms. © UpToDate, Inc. and its affiliates and/or licensors. All rights reserved.  Copyright   ©  UpToDate, Inc. and/or its affiliates. All rights reserved.  Thank you for choosing us for your  care today.  If you have any questions about your ultrasound or care, please do not hesitate to contact us or your primary obstetrician.        Some general instructions for your pregnancy are:    Exercise: Aim for 150 minutes per week of regular exercise.  Walking is great!  Nutrition: Choose healthy sources of calcium, iron, and protein.  Avoid ultraprocessed foods and added sugar.  Learn about Preeclampsia: preeclampsia is a common, potentially serious high blood pressure complication in pregnancy.  A blood pressure of 140mmHg (systolic or top number) or 90mmHg (diastolic or bottom number) should be evaluated by your doctor.  Aspirin is sometimes prescribed in early pregnancy to prevent preeclampsia in women with risk factors - ask your obstetrician if you should be on this medication.  For more  resources, visit:  https://www.highriskpregnancyinfo.org/preeclampsia  If you smoke, please try to quit completely but also try to reduce your smoking by as much as possible (as soon as possible).  Do not vape.  Please also avoid cannabis products.  Other warning signs to watch out for in pregnancy or postpartum: chest pain, obstructed breathing or shortness of breath, seizures, thoughts of hurting yourself or your baby, bleeding, a painful or swollen leg, fever, or headache (see [employer] POST-BIRTH Warning Signs campaign).  If these happen call 911.  Itching is also not normal in pregnancy and if you experience this, especially over your hands and feet, potentially worse at night, notify your doctors.     Kick Counts in Pregnancy   AMBULATORY CARE:   Kick counts  measure how much your baby is moving in your womb. A kick from your baby can be felt as a twist, turn, swish, roll, or jab. It is common to feel your baby kicking at 26 to 28 weeks of pregnancy. You may feel your baby kick as early as 20 weeks of pregnancy. You may want to start counting at 28 weeks.   Contact your doctor immediately if:   You feel a change in the number of kicks or movements of your baby.      You feel fewer than 10 kicks within 2 hours.      You have questions or concerns about your baby's movements.     Why measure kick counts:  Your baby's movement may provide information about your baby's health. He or she may move less, or not at all, if there are problems. Your baby may move less if he or she is not getting enough oxygen or nutrition from the placenta. Do not smoke while you are pregnant. Smoking decreases the amount of oxygen that gets to your baby. Talk to your healthcare provider if you need help to quit smoking. Tell your healthcare provider as soon as you feel a change in your baby's movements.  When to measure kick counts:   Measure kick counts at the same time every day.       Measure kick counts when your baby is awake and  most active. Your baby may be most active in the evening.     How to measure kick counts:  Check that your baby is awake before you measure kick counts. You can wake up your baby by lightly pushing on your belly, walking, or drinking something cold. Your healthcare provider may tell you different ways to measure kick counts. You may be told to do the following:  Use a chart or clock to keep track of the time you start and finish counting.      Sit in a chair or lie on your left side.      Place your hands on the largest part of your belly.      Count until you reach 10 kicks. Write down how much time it takes to count 10 kicks.      It may take 30 minutes to 2 hours to count 10 kicks. It should not take more than 2 hours to count 10 kicks.     Follow up with your doctor as directed:  Write down your questions so you remember to ask them during your visits.   © Copyright [employer] 2023 Information is for End User's use only and may not be sold, redistributed or otherwise used for commercial purposes.  The above information is an  only. It is not intended as medical advice for individual conditions or treatments. Talk to your doctor, nurse or pharmacist before following any medical regimen to see if it is safe and effective for you. Nonstress Test for Pregnancy   WHAT YOU NEED TO KNOW:   What do I need to know about a nonstress test?  A nonstress test measures your baby's heart rate and movements. Nonstress means that no stress will be placed on your baby during the test.  How do I prepare for a nonstress test?  Your healthcare provider will talk to you about how to prepare for this test. Your provider may tell you to eat and drink plenty of liquids before your test. If you smoke, you may be asked not to smoke within 2 hours before the test. Your provider will also tell you which medicines to take or not take on the day of your test.  What will happen during a nonstress test?  You may be asked to lie  down or recline back for the test on a bed. One or 2 belts with sensors will be placed around your abdomen. Your baby's heart rate will be recorded with a machine. If your baby does not move, your baby may be asleep. Your healthcare provider may make a noise near your abdomen to try to wake your baby. The test usually takes about 20 minutes, but can take longer if your baby needs to be awakened.        What do I need to know about the test results?  Your baby will be expected to move at least 2 times for a certain amount of time. Your baby's heart rate will be expected to go up by a certain number of beats per minute during movement. If your baby does not move as expected, the test may need to be repeated or you may need other tests.  CARE AGREEMENT:   You have the right to help plan your care. Learn about your health condition and how it may be treated. Discuss treatment options with your healthcare providers to decide what care you want to receive. You always have the right to refuse treatment. The above information is an  only. It is not intended as medical advice for individual conditions or treatments. Talk to your doctor, nurse or pharmacist before following any medical regimen to see if it is safe and effective for you.  © Copyright [employer] 2023 Information is for End User's use only and may not be sold, redistributed or otherwise used for commercial purposes.

## 2024-09-12 ENCOUNTER — ULTRASOUND (OUTPATIENT)
Dept: PERINATAL CARE | Facility: OTHER | Age: 44
End: 2024-09-12
Payer: COMMERCIAL

## 2024-09-12 VITALS
SYSTOLIC BLOOD PRESSURE: 90 MMHG | HEART RATE: 85 BPM | HEIGHT: 64 IN | WEIGHT: 203.4 LBS | BODY MASS INDEX: 34.72 KG/M2 | DIASTOLIC BLOOD PRESSURE: 50 MMHG

## 2024-09-12 DIAGNOSIS — O99.013 MATERNAL IRON DEFICIENCY ANEMIA AFFECTING PREGNANCY, ANTEPARTUM, THIRD TRIMESTER: ICD-10-CM

## 2024-09-12 DIAGNOSIS — D50.9 MATERNAL IRON DEFICIENCY ANEMIA AFFECTING PREGNANCY, ANTEPARTUM, THIRD TRIMESTER: ICD-10-CM

## 2024-09-12 DIAGNOSIS — O09.523 ELDERLY MULTIGRAVIDA, THIRD TRIMESTER: Primary | ICD-10-CM

## 2024-09-12 DIAGNOSIS — Z3A.34 34 WEEKS GESTATION OF PREGNANCY: ICD-10-CM

## 2024-09-12 DIAGNOSIS — O09.293 PRIOR PREGNANCY WITH PLACENTA ABRUPTION IN THIRD TRIMESTER, ANTEPARTUM: ICD-10-CM

## 2024-09-12 PROCEDURE — 76816 OB US FOLLOW-UP PER FETUS: CPT | Performed by: OBSTETRICS & GYNECOLOGY

## 2024-09-12 PROCEDURE — 59025 FETAL NON-STRESS TEST: CPT | Performed by: OBSTETRICS & GYNECOLOGY

## 2024-09-12 PROCEDURE — 99213 OFFICE O/P EST LOW 20 MIN: CPT | Performed by: OBSTETRICS & GYNECOLOGY

## 2024-09-12 NOTE — LETTER
September 12, 2024     Marcella Schuler DO  [address]  [address]  [place] [address]    Patient: Jolie Garcia   YOB: 1980   Date of Visit: 9/12/2024       Dear Dr. Schuler:    Thank you for referring Jolie Garcia to me for evaluation. Below are my notes for this consultation.    If you have questions, please do not hesitate to call me. I look forward to following your patient along with you.         Sincerely,        Gaurang Castanon MD        CC: No Recipients    Gaurang Castanon MD  9/12/2024 11:58 AM  Sign when Signing Visit  Please refer to the [facility] ultrasound report in Ob Procedures for additional information regarding today's visit

## 2024-09-12 NOTE — PROGRESS NOTES
NST procedure and expected outcome explained to patient.  Daily fetal kick count discussed with handout given.  Patient verbalized understanding of all and was receptive.    Itz Low RN

## 2024-09-17 NOTE — PATIENT INSTRUCTIONS
Thank you for choosing us for your  care today.  If you have any questions about your ultrasound or care, please do not hesitate to contact us or your primary obstetrician.        Some general instructions for your pregnancy are:    Exercise: Aim for 150 minutes per week of regular exercise.  Walking is great!  Nutrition: Choose healthy sources of calcium, iron, and protein.  Avoid ultraprocessed foods and added sugar.  Learn about Preeclampsia: preeclampsia is a common, potentially serious high blood pressure complication in pregnancy.  A blood pressure of 140mmHg (systolic or top number) or 90mmHg (diastolic or bottom number) should be evaluated by your doctor.  Aspirin is sometimes prescribed in early pregnancy to prevent preeclampsia in women with risk factors - ask your obstetrician if you should be on this medication.  For more resources, visit:  https://www.highriskpregnancyinfo.org/preeclampsia  If you smoke, please try to quit completely but also try to reduce your smoking by as much as possible (as soon as possible).  Do not vape.  Please also avoid cannabis products.  Other warning signs to watch out for in pregnancy or postpartum: chest pain, obstructed breathing or shortness of breath, seizures, thoughts of hurting yourself or your baby, bleeding, a painful or swollen leg, fever, or headache (see [facility]).  If these happen call 911.  Itching is also not normal in pregnancy and if you experience this, especially over your hands and feet, potentially worse at night, notify your doctors.     Kick Counts in Pregnancy   AMBULATORY CARE:   Kick counts  measure how much your baby is moving in your womb. A kick from your baby can be felt as a twist, turn, swish, roll, or jab. It is common to feel your baby kicking at 26 to 28 weeks of pregnancy. You may feel your baby kick as early as 20 weeks of pregnancy. You may want to start counting at 28 weeks.   Contact your  doctor immediately if:   You feel a change in the number of kicks or movements of your baby.      You feel fewer than 10 kicks within 2 hours.      You have questions or concerns about your baby's movements.     Why measure kick counts:  Your baby's movement may provide information about your baby's health. He or she may move less, or not at all, if there are problems. Your baby may move less if he or she is not getting enough oxygen or nutrition from the placenta. Do not smoke while you are pregnant. Smoking decreases the amount of oxygen that gets to your baby. Talk to your healthcare provider if you need help to quit smoking. Tell your healthcare provider as soon as you feel a change in your baby's movements.  When to measure kick counts:   Measure kick counts at the same time every day.       Measure kick counts when your baby is awake and most active. Your baby may be most active in the evening.     How to measure kick counts:  Check that your baby is awake before you measure kick counts. You can wake up your baby by lightly pushing on your belly, walking, or drinking something cold. Your healthcare provider may tell you different ways to measure kick counts. You may be told to do the following:  Use a chart or clock to keep track of the time you start and finish counting.      Sit in a chair or lie on your left side.      Place your hands on the largest part of your belly.      Count until you reach 10 kicks. Write down how much time it takes to count 10 kicks.      It may take 30 minutes to 2 hours to count 10 kicks. It should not take more than 2 hours to count 10 kicks.     Follow up with your doctor as directed:  Write down your questions so you remember to ask them during your visits.   © Copyright [employer] 2023 Information is for End User's use only and may not be sold, redistributed or otherwise used for commercial purposes.  The above information is an  only. It is not intended as  medical advice for individual conditions or treatments. Talk to your doctor, nurse or pharmacist before following any medical regimen to see if it is safe and effective for you. Nonstress Test for Pregnancy   WHAT YOU NEED TO KNOW:   What do I need to know about a nonstress test?  A nonstress test measures your baby's heart rate and movements. Nonstress means that no stress will be placed on your baby during the test.  How do I prepare for a nonstress test?  Your healthcare provider will talk to you about how to prepare for this test. Your provider may tell you to eat and drink plenty of liquids before your test. If you smoke, you may be asked not to smoke within 2 hours before the test. Your provider will also tell you which medicines to take or not take on the day of your test.  What will happen during a nonstress test?  You may be asked to lie down or recline back for the test on a bed. One or 2 belts with sensors will be placed around your abdomen. Your baby's heart rate will be recorded with a machine. If your baby does not move, your baby may be asleep. Your healthcare provider may make a noise near your abdomen to try to wake your baby. The test usually takes about 20 minutes, but can take longer if your baby needs to be awakened.        What do I need to know about the test results?  Your baby will be expected to move at least 2 times for a certain amount of time. Your baby's heart rate will be expected to go up by a certain number of beats per minute during movement. If your baby does not move as expected, the test may need to be repeated or you may need other tests.  CARE AGREEMENT:   You have the right to help plan your care. Learn about your health condition and how it may be treated. Discuss treatment options with your healthcare providers to decide what care you want to receive. You always have the right to refuse treatment. The above information is an  only. It is not intended as  medical advice for individual conditions or treatments. Talk to your doctor, nurse or pharmacist before following any medical regimen to see if it is safe and effective for you.  © Copyright [employer] 2023 Information is for End User's use only and may not be sold, redistributed or otherwise used for commercial purposes.

## 2024-09-19 ENCOUNTER — TELEPHONE (OUTPATIENT)
Dept: OBGYN CLINIC | Facility: CLINIC | Age: 44
End: 2024-09-19

## 2024-09-19 ENCOUNTER — ULTRASOUND (OUTPATIENT)
Dept: PERINATAL CARE | Facility: OTHER | Age: 44
End: 2024-09-19
Payer: COMMERCIAL

## 2024-09-19 VITALS
BODY MASS INDEX: 35.17 KG/M2 | DIASTOLIC BLOOD PRESSURE: 80 MMHG | HEART RATE: 91 BPM | HEIGHT: 64 IN | WEIGHT: 206 LBS | SYSTOLIC BLOOD PRESSURE: 114 MMHG

## 2024-09-19 DIAGNOSIS — Z3A.35 35 WEEKS GESTATION OF PREGNANCY: Primary | ICD-10-CM

## 2024-09-19 DIAGNOSIS — O09.293 PRIOR PREGNANCY WITH PLACENTA ABRUPTION IN THIRD TRIMESTER, ANTEPARTUM: ICD-10-CM

## 2024-09-19 PROCEDURE — 59025 FETAL NON-STRESS TEST: CPT | Performed by: OBSTETRICS & GYNECOLOGY

## 2024-09-19 PROCEDURE — 76815 OB US LIMITED FETUS(S): CPT | Performed by: OBSTETRICS & GYNECOLOGY

## 2024-09-19 NOTE — TELEPHONE ENCOUNTER
----- Message from Bessy Oseguera MD sent at 9/19/2024  8:34 AM EDT -----  Please let Jolie know that her CBC is responding to her oral iron.  She should continue taking her iron until her delivery.  If she begins to have GI upset or constipation, she should let us know so we can help manage those symptoms.    Thanks!!

## 2024-09-23 NOTE — PATIENT INSTRUCTIONS
Thank you for choosing us for your  care today.  If you have any questions about your ultrasound or care, please do not hesitate to contact us or your primary obstetrician.        Some general instructions for your pregnancy are:    Exercise: Aim for 150 minutes per week of regular exercise.  Walking is great!  Nutrition: Choose healthy sources of calcium, iron, and protein.  Avoid ultraprocessed foods and added sugar.  Learn about Preeclampsia: preeclampsia is a common, potentially serious high blood pressure complication in pregnancy.  A blood pressure of 140mmHg (systolic or top number) or 90mmHg (diastolic or bottom number) should be evaluated by your doctor.  Aspirin is sometimes prescribed in early pregnancy to prevent preeclampsia in women with risk factors - ask your obstetrician if you should be on this medication.  For more resources, visit:  https://www.highriskpregnancyinfo.org/preeclampsia  If you smoke, please try to quit completely but also try to reduce your smoking by as much as possible (as soon as possible).  Do not vape.  Please also avoid cannabis products.  Other warning signs to watch out for in pregnancy or postpartum: chest pain, obstructed breathing or shortness of breath, seizures, thoughts of hurting yourself or your baby, bleeding, a painful or swollen leg, fever, or headache (see [facility] Sign[facility]).  If these happen call 911.  Itching is also not normal in pregnancy and if you experience this, especially over your hands and feet, potentially worse at night, notify your doctors.     Kick Counts in Pregnancy   AMBULATORY CARE:   Kick counts  measure how much your baby is moving in your womb. A kick from your baby can be felt as a twist, turn, swish, roll, or jab. It is common to feel your baby kicking at 26 to 28 weeks of pregnancy. You may feel your baby kick as early as 20 weeks of pregnancy. You may want to start counting at 28 weeks.   Contact your  doctor immediately if:   You feel a change in the number of kicks or movements of your baby.      You feel fewer than 10 kicks within 2 hours.      You have questions or concerns about your baby's movements.     Why measure kick counts:  Your baby's movement may provide information about your baby's health. He or she may move less, or not at all, if there are problems. Your baby may move less if he or she is not getting enough oxygen or nutrition from the placenta. Do not smoke while you are pregnant. Smoking decreases the amount of oxygen that gets to your baby. Talk to your healthcare provider if you need help to quit smoking. Tell your healthcare provider as soon as you feel a change in your baby's movements.  When to measure kick counts:   Measure kick counts at the same time every day.       Measure kick counts when your baby is awake and most active. Your baby may be most active in the evening.     How to measure kick counts:  Check that your baby is awake before you measure kick counts. You can wake up your baby by lightly pushing on your belly, walking, or drinking something cold. Your healthcare provider may tell you different ways to measure kick counts. You may be told to do the following:  Use a chart or clock to keep track of the time you start and finish counting.      Sit in a chair or lie on your left side.      Place your hands on the largest part of your belly.      Count until you reach 10 kicks. Write down how much time it takes to count 10 kicks.      It may take 30 minutes to 2 hours to count 10 kicks. It should not take more than 2 hours to count 10 kicks.     Follow up with your doctor as directed:  Write down your questions so you remember to ask them during your visits.   © Copyright [employer] 2023 Information is for End User's use only and may not be sold, redistributed or otherwise used for commercial purposes.  The above information is an  only. It is not intended as  medical advice for individual conditions or treatments. Talk to your doctor, nurse or pharmacist before following any medical regimen to see if it is safe and effective for you. Nonstress Test for Pregnancy   WHAT YOU NEED TO KNOW:   What do I need to know about a nonstress test?  A nonstress test measures your baby's heart rate and movements. Nonstress means that no stress will be placed on your baby during the test.  How do I prepare for a nonstress test?  Your healthcare provider will talk to you about how to prepare for this test. Your provider may tell you to eat and drink plenty of liquids before your test. If you smoke, you may be asked not to smoke within 2 hours before the test. Your provider will also tell you which medicines to take or not take on the day of your test.  What will happen during a nonstress test?  You may be asked to lie down or recline back for the test on a bed. One or 2 belts with sensors will be placed around your abdomen. Your baby's heart rate will be recorded with a machine. If your baby does not move, your baby may be asleep. Your healthcare provider may make a noise near your abdomen to try to wake your baby. The test usually takes about 20 minutes, but can take longer if your baby needs to be awakened.        What do I need to know about the test results?  Your baby will be expected to move at least 2 times for a certain amount of time. Your baby's heart rate will be expected to go up by a certain number of beats per minute during movement. If your baby does not move as expected, the test may need to be repeated or you may need other tests.  CARE AGREEMENT:   You have the right to help plan your care. Learn about your health condition and how it may be treated. Discuss treatment options with your healthcare providers to decide what care you want to receive. You always have the right to refuse treatment. The above information is an  only. It is not intended as  medical advice for individual conditions or treatments. Talk to your doctor, nurse or pharmacist before following any medical regimen to see if it is safe and effective for you.  © Copyright [employer] 2023 Information is for End User's use only and may not be sold, redistributed or otherwise used for commercial purposes.

## 2024-09-24 ENCOUNTER — ROUTINE PRENATAL (OUTPATIENT)
Age: 44
End: 2024-09-24
Payer: COMMERCIAL

## 2024-09-24 VITALS — WEIGHT: 205.4 LBS | BODY MASS INDEX: 35.26 KG/M2 | SYSTOLIC BLOOD PRESSURE: 112 MMHG | DIASTOLIC BLOOD PRESSURE: 72 MMHG

## 2024-09-24 DIAGNOSIS — Z34.83 PRENATAL CARE, SUBSEQUENT PREGNANCY, THIRD TRIMESTER: Primary | ICD-10-CM

## 2024-09-24 DIAGNOSIS — Z3A.36 36 WEEKS GESTATION OF PREGNANCY: ICD-10-CM

## 2024-09-24 LAB
SL AMB  POCT GLUCOSE, UA: NEGATIVE
SL AMB POCT URINE PROTEIN: POSITIVE

## 2024-09-24 PROCEDURE — 99213 OFFICE O/P EST LOW 20 MIN: CPT | Performed by: STUDENT IN AN ORGANIZED HEALTH CARE EDUCATION/TRAINING PROGRAM

## 2024-09-24 PROCEDURE — 87150 DNA/RNA AMPLIFIED PROBE: CPT | Performed by: STUDENT IN AN ORGANIZED HEALTH CARE EDUCATION/TRAINING PROGRAM

## 2024-09-24 PROCEDURE — 81002 URINALYSIS NONAUTO W/O SCOPE: CPT | Performed by: STUDENT IN AN ORGANIZED HEALTH CARE EDUCATION/TRAINING PROGRAM

## 2024-09-24 NOTE — PROGRESS NOTES
44 y.o.  at 36w1d, here for routine OB visit. Good FM. Denies LOF, VB, contractions.   Her  just had a stroke 1.5 weeks ago, so she is going through a lot at the moment. Feels that this has led to progression of her pregnancy. Feels lots more pressure, as if head is very low on cervix. She is very concerned about labor, as she not have anyone at home who can drive.      Problem List Items Addressed This Visit          Obstetrics/Gynecology    36 weeks gestation of pregnancy     -precautions reviewed  -GBS collected today  -reviewed delivery planning in light of 's stroke and inability to drive or currently be present in delivery room  -declines vaccines in pregnancy  -prepregnancy BMI 30 with goal weight gain 11-20#: TWG = 30#          Other Visit Diagnoses       Prenatal care, subsequent pregnancy, third trimester    -  Primary    Relevant Orders    POCT urine dip (Completed)    Strep B DNA probe, amplification

## 2024-09-24 NOTE — ASSESSMENT & PLAN NOTE
-precautions reviewed  -GBS collected today  -reviewed delivery planning in light of 's stroke and inability to drive or currently be present in delivery room  -declines vaccines in pregnancy  -prepregnancy BMI 30 with goal weight gain 11-20#: TWG = 30#

## 2024-09-24 NOTE — PROGRESS NOTES
Pt is here for routine ob visit   No concerns at this time  Urine +1 protein/neg glucose   No LOF,VB  Lam Velez Contractions   +FM   GBS collected today   Delivery consent signed at previous visit

## 2024-09-26 ENCOUNTER — NURSE TRIAGE (OUTPATIENT)
Age: 44
End: 2024-09-26

## 2024-09-26 ENCOUNTER — ULTRASOUND (OUTPATIENT)
Dept: PERINATAL CARE | Facility: OTHER | Age: 44
End: 2024-09-26
Payer: COMMERCIAL

## 2024-09-26 ENCOUNTER — HOSPITAL ENCOUNTER (OUTPATIENT)
Facility: HOSPITAL | Age: 44
Discharge: HOME | End: 2024-09-26
Attending: STUDENT IN AN ORGANIZED HEALTH CARE EDUCATION/TRAINING PROGRAM | Admitting: STUDENT IN AN ORGANIZED HEALTH CARE EDUCATION/TRAINING PROGRAM
Payer: COMMERCIAL

## 2024-09-26 VITALS
WEIGHT: 206.2 LBS | BODY MASS INDEX: 35.2 KG/M2 | SYSTOLIC BLOOD PRESSURE: 100 MMHG | HEART RATE: 70 BPM | DIASTOLIC BLOOD PRESSURE: 64 MMHG | HEIGHT: 64 IN

## 2024-09-26 VITALS — TEMPERATURE: 98.1 F | RESPIRATION RATE: 18 BRPM

## 2024-09-26 DIAGNOSIS — O40.3XX0 POLYHYDRAMNIOS AFFECTING PREGNANCY IN THIRD TRIMESTER: ICD-10-CM

## 2024-09-26 DIAGNOSIS — Z3A.36 36 WEEKS GESTATION OF PREGNANCY: ICD-10-CM

## 2024-09-26 DIAGNOSIS — O09.523 ADVANCED MATERNAL AGE IN MULTIGRAVIDA, THIRD TRIMESTER: Primary | ICD-10-CM

## 2024-09-26 PROBLEM — R10.2 VAGINAL PAIN: Status: ACTIVE | Noted: 2024-09-26

## 2024-09-26 PROBLEM — R60.0 LOWER EXTREMITY EDEMA: Status: ACTIVE | Noted: 2024-09-26

## 2024-09-26 PROBLEM — O47.9 UTERINE CONTRACTIONS: Status: ACTIVE | Noted: 2024-09-26

## 2024-09-26 PROBLEM — R60.0 LOWER EXTREMITY EDEMA: Chronic | Status: ACTIVE | Noted: 2024-09-26

## 2024-09-26 PROBLEM — O26.899: Status: ACTIVE | Noted: 2024-09-26

## 2024-09-26 PROBLEM — R35.0: Status: ACTIVE | Noted: 2024-09-26

## 2024-09-26 LAB — GP B STREP DNA SPEC QL NAA+PROBE: NEGATIVE

## 2024-09-26 PROCEDURE — 76815 OB US LIMITED FETUS(S): CPT | Performed by: OBSTETRICS & GYNECOLOGY

## 2024-09-26 PROCEDURE — 59025 FETAL NON-STRESS TEST: CPT | Performed by: OBSTETRICS & GYNECOLOGY

## 2024-09-26 PROCEDURE — 99214 OFFICE O/P EST MOD 30 MIN: CPT

## 2024-09-26 PROCEDURE — 99213 OFFICE O/P EST LOW 20 MIN: CPT | Performed by: OBSTETRICS & GYNECOLOGY

## 2024-09-26 PROCEDURE — 99214 OFFICE O/P EST MOD 30 MIN: CPT | Performed by: OBSTETRICS & GYNECOLOGY

## 2024-09-26 PROCEDURE — NC001 PR NO CHARGE: Performed by: STUDENT IN AN ORGANIZED HEALTH CARE EDUCATION/TRAINING PROGRAM

## 2024-09-26 NOTE — ASSESSMENT & PLAN NOTE
Patient with contractions q5min on toco, imperceptible to her. SVE 2/60/-2, unchanged from check on 9/24/24 and after 2 hours in triage. Low concern for labor at this time  - continue routine prenatal care  - return precautions given

## 2024-09-26 NOTE — PROGRESS NOTES
L&D Triage Note - OB/GYN  Jolie Garcia 44 y.o. female MRN: [id number]  Unit/Bed#: LD TRIAGE - Encounter: [id number]    Patient is seen by SLOGA    ASSESSMENT  Jolie Garcia is a 44 y.o.  at 36w3d who presented for increased swelling, urinary frequency, and increased vaginal pressure. She is stable with workup not suggestive of preeclampsia or labor at this time.    * Lower extremity edema  Assessment & Plan  Patient with edema in bilateral feet. No signs/symptoms of preeclampsia. /77. Edema likely 2/2 increasing gestational age  - reassured patient of normal findings  - continue to monitor for unilateral edema, headaches, visual disturbance, RUQ pain    Uterine contractions  Assessment & Plan  Patient with contractions q5min on toco, imperceptible to her. SVE 2/60/-2, unchanged from check on 24 and after 2 hours in triage. Low concern for labor at this time  - continue routine prenatal care  - return precautions given    Vaginal pain  Assessment & Plan  Labor ruled out. Symptoms likely 2/2 increasing gestational age  - provided education on physiology of normal pregnancy     Increased urinary frequency during pregnancy  Assessment & Plan  Patient without additional symptoms concerning for infection. Increased urinary frequency likely 2/2 increasing gestational age  - provided education on physiology of normal pregnancy      #Discharge instructions  - patient instructed to call/return if experiencing worsening contractions, vaginal bleeding, loss of fluid, or decreased fetal movement  - next OB appointment: 10/1/24    Patient discussed with attending physician Dr. Sommers  ______________    SUBJECTIVE  NELLY: Estimated Date of Delivery: 10/21/24  HPI:  44 y.o.  @36w3d with history of endometriosis, HSV2 presents with complaint of increased swelling, urinary frequency, and increased vaginal pressure. This pregnancy is complicated by polyhydramnios, AMA. Her prior pregnancy was  complicated by possible placental abruption, cervical shortening.    Patient reports increased swelling in her feet over the past few days. She denies upper extremity or facial swelling, lower extremity pain or erythema, headaches, visual disturbances, or RUQ pain. She also reports increased urinary frequency and increased vaginal pressure over the same time period. She denies contractions, vaginal bleeding, leakage of fluid, or decreased fetal movement. She notes that her  recently had a stroke and she has been under increased stress.    OBJECTIVE:  Vitals:  Temp 98.1 °F (36.7 °C) (Oral)   Resp 18   LMP 01/15/2024   There is no height or weight on file to calculate BMI.  /77    Physical Exam  Constitutional:       General: She is not in acute distress.     Appearance: She is well-developed.   HENT:      Head: Normocephalic and atraumatic.   Pulmonary:      Effort: Pulmonary effort is normal. No respiratory distress.   Genitourinary:     Comments: Normal appearing external female genitalia  Normal appearing urethral meatus  SVE: 2/60/-2    Musculoskeletal:      Right lower leg: Edema (2+ pitting edema to ankle) present.      Left lower leg: Edema (2+ pitting edema to ankle) present.   Skin:     Findings: No rash (on exposed skin).   Neurological:      Mental Status: She is alert and oriented to person, place, and time.   Psychiatric:         Mood and Affect: Affect normal.         Speech: Speech normal.         Behavior: Behavior normal.       FHT:  Baseline Rate (FHR): 120 bpm  Variability: Moderate  Accelerations: 15 x 15 or greater, With fetal movement  Decelerations: None  FHR Category: Category I    TOCO:   Contraction Frequency (minutes): 7-8  Contraction Duration (seconds):   Contraction Intensity: Mild    IMAGING:      TAUS   JAMES      - Q1 3.33 cm     - Q2 5.29 cm     - Q3 7.81 cm     - Q4 2.74 cm     - Total: 19.17 cm            Labs: No results found for this or any previous visit  (from the past 24 hour(s)).    Tony Jackson MD  [facility] PGY-1  09/26/24  7:24 PM

## 2024-09-26 NOTE — TELEPHONE ENCOUNTER
"36 weeks 3 days EDC 10/21/2024   had a stroke a week ago. Patient states she has been very stressed. Last OB appointment , had protein in her urine. Had MFM appointment today and was told more amniotic fluid ( JAMES on report 25.4) patient states bilateral foot swelling has increased and she is retaining more fluid. Feet/ ankle and lower legs swollen even after sleeping and not decreasing. No hand or face swelling. No HA. No RUQ pain. No vision changes. Baby moving well. No LOF now but does feel more vaginal discharge.Mucous pink discharge since had B strep testing. Patient is scheduled for weekly  testing with MFM, has no OB appts scheduled. Patient reports she has increased pressure lower uterus  that increases with movement. Patient reports she was told baby is laying very low so may be causing increases pressure but she would have more reassurance if she could be seen today in L&D to know all is Okay. No OB appts available to schedule [place]/ [place] offices. S/w Mee @ OB office, recommended to send via [employer].  ESC sent to Dr. Duarte- Her blood pressure was very good at the MFM visit. If she feels better coming to L+D, she can come   14:17 phone call back to patient and she will be going to [place].   ESC to AN L&D Charge       Answer Assessment - Initial Assessment Questions  1. ONSET: \"When did the swelling start?\" (e.g., minutes, hours, days)      Feet and legs  2. LOCATION: \"What part of the leg is swollen?\"  \"Are both legs swollen or just one leg?\"      Feet/ ankles /lower legs L>R   3. SEVERITY: \"How bad is the swelling?\" (e.g., localized; mild, moderate, severe)   - Localized - small area of swelling localized to one leg   - MILD pedal edema - swelling limited to foot and ankle, pitting edema < 1/4 inch (6 mm) deep, rest and elevation eliminate most or all swelling   - MODERATE edema - swelling of lower leg to knee, pitting edema > 1/4 inch (6 mm) deep, rest and elevation " "only partially reduce swelling   - SEVERE edema - swelling extends above knee, facial or hand swelling present       moderate  4. REDNESS: \"Does the swelling look red or infected?\"      denies  5. PAIN: \"Is the swelling painful to touch?\" If Yes, ask: \"How painful is it?\"   (Scale 1-10; mild, moderate or severe)      denies  6. FEVER: \"Do you have a fever?\" If Yes, ask: \"What is it, how was it measured, and when did it start?\"       denies  7. CAUSE: \"What do you think is causing the leg swelling?\"      pregnancy  8. MEDICAL HISTORY: \"Do you have a history of blood clots, heart failure, kidney disease, liver failure, or cancer?\"      denies  9. OTHER SYMPTOMS: \"Do you have any other symptoms?\" (e.g., chest pain, difficulty breathing)      denies  10. NELLY: \"What date are you expecting to deliver?\"        10/21/24    Protocols used: Pregnancy - Leg Swelling and Edema-ADULT-[place]    "

## 2024-09-26 NOTE — ASSESSMENT & PLAN NOTE
Labor ruled out. Symptoms likely 2/2 increasing gestational age  - provided education on physiology of normal pregnancy

## 2024-09-26 NOTE — ASSESSMENT & PLAN NOTE
Patient with edema in bilateral feet. No signs/symptoms of preeclampsia. /77. Edema likely 2/2 increasing gestational age  - reassured patient of normal findings  - continue to monitor for unilateral edema, headaches, visual disturbance, RUQ pain

## 2024-09-26 NOTE — PROGRESS NOTES
Progress Note - OB/GYN   Name: Jolie Garcia 44 y.o. female I MRN: [id number]  Unit/Bed#: LD TRIAGE 1-01 I Date of Admission: 9/26/2024   Date of Service: 9/26/2024 I Hospital Day: 0       Jolie is not feeling her contractions, she describes a lot of fetal movement at time of contraction. She is having q5-10 min contractions, irregularly. She denies LOF, VB and decreased fetal movement. SVE remains unchanged. Reviewed labor precautions and instructed patient to continue to follow with routine obstetric care. Plan discussed with Dr. Vega.     Nelly Norris MD  OBGYN PGY-4  09/26/24 6:27 PM

## 2024-09-30 NOTE — PATIENT INSTRUCTIONS
Kick Counts in Pregnancy   WHAT YOU NEED TO KNOW:   What do I need to know about kick counts?  Kick counts measure how much your baby is moving in your womb. A kick from your baby can be felt as a twist, turn, swish, roll, or jab. It is common to feel your baby kicking at 26 to 28 weeks of pregnancy. You may feel your baby kick as early as 20 weeks of pregnancy. You may want to start counting at 28 weeks.   Why should I measure kick counts?  Your baby's movement may provide information about your baby's health. He or she may move less, or not at all, if there are problems. Your baby may move less if he or she is not getting enough oxygen or nutrition from the placenta. Do not smoke while you are pregnant. Smoking decreases the amount of oxygen that gets to your baby. Talk to your healthcare provider if you need help to quit smoking. Tell your healthcare provider as soon as you feel a change in your baby's movements.  When do I measure kick counts?   Measure kick counts at the same time every day.       Measure kick counts when your baby is awake and most active. Your baby may be most active in the evening.     How do I measure kick counts?  Check that your baby is awake before you measure kick counts. You can wake up your baby by lightly pushing on your belly, walking, or drinking something cold. Your healthcare provider may tell you different ways to measure kick counts. You may be told to do the following:  Use a chart or clock to keep track of the time you start and finish counting.      Sit in a chair or lie on your left side.      Place your hands on the largest part of your belly.      Count until you reach 10 kicks. Write down how much time it takes to count 10 kicks.      It may take 30 minutes to 2 hours to count 10 kicks. It should not take more than 2 hours to count 10 kicks.     When should I contact my doctor?   You feel a change in the number of kicks or movements of your baby.      You feel fewer than  10 kicks within 2 hours.      You have questions or concerns about your baby's movements.     CARE AGREEMENT:   You have the right to help plan your care. Learn about your health condition and how it may be treated. Discuss treatment options with your healthcare providers to decide what care you want to receive. You always have the right to refuse treatment. The above information is an  only. It is not intended as medical advice for individual conditions or treatments. Talk to your doctor, nurse or pharmacist before following any medical regimen to see if it is safe and effective for you.  © Copyright [employer] 2022 Information is for End User's use only and may not be sold, redistributed or otherwise used for commercial purposes. All illustrations and images included in CareNotes® are the copyrighted property of [employer] or Andre Phillipe    Kick Counts in Pregnancy   AMBULATORY CARE:   Kick counts  measure how much your baby is moving in your womb. A kick from your baby can be felt as a twist, turn, swish, roll, or jab. It is common to feel your baby kicking at 26 to 28 weeks of pregnancy. You may feel your baby kick as early as 20 weeks of pregnancy. You may want to start counting at 28 weeks.   Contact your doctor immediately if:   You feel a change in the number of kicks or movements of your baby.      You feel fewer than 10 kicks within 2 hours.      You have questions or concerns about your baby's movements.     Why measure kick counts:  Your baby's movement may provide information about your baby's health. He or she may move less, or not at all, if there are problems. Your baby may move less if he or she is not getting enough oxygen or nutrition from the placenta. Do not smoke while you are pregnant. Smoking decreases the amount of oxygen that gets to your baby. Talk to your healthcare provider if you need help to quit smoking. Tell your healthcare provider as soon as you feel a  change in your baby's movements.  When to measure kick counts:   Measure kick counts at the same time every day.       Measure kick counts when your baby is awake and most active. Your baby may be most active in the evening.     How to measure kick counts:  Check that your baby is awake before you measure kick counts. You can wake up your baby by lightly pushing on your belly, walking, or drinking something cold. Your healthcare provider may tell you different ways to measure kick counts. You may be told to do the following:  Use a chart or clock to keep track of the time you start and finish counting.      Sit in a chair or lie on your left side.      Place your hands on the largest part of your belly.      Count until you reach 10 kicks. Write down how much time it takes to count 10 kicks.      It may take 30 minutes to 2 hours to count 10 kicks. It should not take more than 2 hours to count 10 kicks.     Follow up with your doctor as directed:  Write down your questions so you remember to ask them during your visits.   © Copyright [employer] 2023 Information is for End User's use only and may not be sold, redistributed or otherwise used for commercial purposes.  The above information is an  only. It is not intended as medical advice for individual conditions or treatments. Talk to your doctor, nurse or pharmacist before following any medical regimen to see if it is safe and effective for you. Nonstress Test for Pregnancy   WHAT YOU NEED TO KNOW:   What do I need to know about a nonstress test?  A nonstress test measures your baby's heart rate and movements. Nonstress means that no stress will be placed on your baby during the test.  How do I prepare for a nonstress test?  Your healthcare provider will talk to you about how to prepare for this test. Your provider may tell you to eat and drink plenty of liquids before your test. If you smoke, you may be asked not to smoke within 2 hours before the  test. Your provider will also tell you which medicines to take or not take on the day of your test.  What will happen during a nonstress test?  You may be asked to lie down or recline back for the test on a bed. One or 2 belts with sensors will be placed around your abdomen. Your baby's heart rate will be recorded with a machine. If your baby does not move, your baby may be asleep. Your healthcare provider may make a noise near your abdomen to try to wake your baby. The test usually takes about 20 minutes, but can take longer if your baby needs to be awakened.        What do I need to know about the test results?  Your baby will be expected to move at least 2 times for a certain amount of time. Your baby's heart rate will be expected to go up by a certain number of beats per minute during movement. If your baby does not move as expected, the test may need to be repeated or you may need other tests.  CARE AGREEMENT:   You have the right to help plan your care. Learn about your health condition and how it may be treated. Discuss treatment options with your healthcare providers to decide what care you want to receive. You always have the right to refuse treatment. The above information is an  only. It is not intended as medical advice for individual conditions or treatments. Talk to your doctor, nurse or pharmacist before following any medical regimen to see if it is safe and effective for you.  © 2023 Information is for End User's use only and may not be sold, redistributed or otherwise used for commercial purposes. Thank you for choosing us for your  care today.  If you have any questions about your ultrasound or care, please do not hesitate to contact us or your primary obstetrician.        Some general instructions for your pregnancy are:    Exercise: Aim for 150 minutes per week of regular exercise.  Walking is great!  Nutrition: Choose healthy sources of calcium, iron,  and protein.  Avoid ultraprocessed foods and added sugar.  Learn about Preeclampsia: preeclampsia is a common, potentially serious high blood pressure complication in pregnancy.  A blood pressure of 140mmHg (systolic or top number) or 90mmHg (diastolic or bottom number) should be evaluated by your doctor.  Aspirin is sometimes prescribed in early pregnancy to prevent preeclampsia in women with risk factors - ask your obstetrician if you should be on this medication.  For more resources, visit:  https://www.highriskpregnancyinfo.org/preeclampsia  If you smoke, please try to quit completely but also try to reduce your smoking by as much as possible (as soon as possible).  Do not vape.  Please also avoid cannabis products.  Other warning signs to watch out for in pregnancy or postpartum: chest pain, obstructed breathing or shortness of breath, seizures, thoughts of hurting yourself or your baby, bleeding, a painful or swollen leg, fever, or headache (see [facility] POST-BIRTH Warning Signs campaign).  If these happen call 911.  Itching is also not normal in pregnancy and if you experience this, especially over your hands and feet, potentially worse at night, notify your doctors.

## 2024-10-01 ENCOUNTER — TELEPHONE (OUTPATIENT)
Dept: OBGYN CLINIC | Facility: CLINIC | Age: 44
End: 2024-10-01

## 2024-10-01 ENCOUNTER — ROUTINE PRENATAL (OUTPATIENT)
Age: 44
End: 2024-10-01
Payer: COMMERCIAL

## 2024-10-01 VITALS
HEART RATE: 95 BPM | DIASTOLIC BLOOD PRESSURE: 80 MMHG | BODY MASS INDEX: 35.67 KG/M2 | SYSTOLIC BLOOD PRESSURE: 122 MMHG | WEIGHT: 207.8 LBS

## 2024-10-01 DIAGNOSIS — Z34.83 PRENATAL CARE, SUBSEQUENT PREGNANCY, THIRD TRIMESTER: Primary | ICD-10-CM

## 2024-10-01 DIAGNOSIS — O09.293 PRIOR PREGNANCY WITH PLACENTA ABRUPTION IN THIRD TRIMESTER, ANTEPARTUM: ICD-10-CM

## 2024-10-01 DIAGNOSIS — Z3A.37 37 WEEKS GESTATION OF PREGNANCY: ICD-10-CM

## 2024-10-01 DIAGNOSIS — O09.293 HISTORY OF PRIOR PREGNANCY WITH SHORT CERVIX, CURRENTLY PREGNANT IN THIRD TRIMESTER: ICD-10-CM

## 2024-10-01 PROBLEM — R35.0: Status: RESOLVED | Noted: 2024-09-26 | Resolved: 2024-10-01

## 2024-10-01 PROBLEM — R10.2 VAGINAL PAIN: Status: RESOLVED | Noted: 2024-09-26 | Resolved: 2024-10-01

## 2024-10-01 PROBLEM — R60.0 LOWER EXTREMITY EDEMA: Chronic | Status: RESOLVED | Noted: 2024-09-26 | Resolved: 2024-10-01

## 2024-10-01 PROBLEM — N88.3 SHORT CERVIX: Status: RESOLVED | Noted: 2019-10-28 | Resolved: 2024-10-01

## 2024-10-01 PROBLEM — O26.899: Status: RESOLVED | Noted: 2024-09-26 | Resolved: 2024-10-01

## 2024-10-01 PROBLEM — O47.9 UTERINE CONTRACTIONS: Status: RESOLVED | Noted: 2024-09-26 | Resolved: 2024-10-01

## 2024-10-01 LAB
SL AMB  POCT GLUCOSE, UA: NORMAL
SL AMB POCT URINE PROTEIN: NORMAL

## 2024-10-01 PROCEDURE — 81002 URINALYSIS NONAUTO W/O SCOPE: CPT | Performed by: NURSE PRACTITIONER

## 2024-10-01 PROCEDURE — 99213 OFFICE O/P EST LOW 20 MIN: CPT | Performed by: NURSE PRACTITIONER

## 2024-10-01 NOTE — PROGRESS NOTES
Problem   37 Weeks Gestation of Pregnancy    Blood Type:  A+    Antibody screen: neg  Pap done 4/25  GC/CT -  neg  PN1 Labs- done  28 Week Labs- anemia, on iron, recheck done in September (labs still show anemia, improved ferritin    Blue folder- reviewed  Yellow folder- reviewed    MatT21 neg  AFP- negative  Level 1 5/9  Level 2- 6/6  FG on 8/1 at 28 weeks  FG on 9/12 at 34 wks  NSTs with JAMES weekly for mild polyhydramnios    TDAP - declined  RSV- declined  Flu- declined    Delivery consent- signed  Breast pump - has  Pediatrician - has  L&D forms- submitted     Perineal massage -  reinforced  GBS swab - neg  IOL - consent signed           Uterine Contractions (Resolved)   Lower Extremity Edema (Resolved)   Increased Urinary Frequency During Pregnancy (Resolved)   Vaginal Pain (Resolved)   Short Cervix (Resolved)    Her records show that in her 2/19/19 pregnancy she was followed for cervical shortening found at 21 weeks. By 24 weeks her cervix was 1 cm in length and she had no intact cervix by 29 weeks and was 1 centimeter dilated. She then progressed to 2 cm dilated for weeks and delivered at 38 weeks. During the pregnancy she got stereoids x 2 courses and she was on progesterone during the pregnancy.       37 weeks gestation of pregnancy  She feels well. She denies LOF/CTX/VB. She did not voice any concerns. Discussed fetal kick counting.  She was encouraged to continue with her perineal/vaginal massages to prevent lacerations during the labor process but she has not been able to do them the past 2 weeks. Her hubby had a stroke.  Overview charting updated today.

## 2024-10-01 NOTE — TELEPHONE ENCOUNTER
10/14 7 AM ML    Patient notified of IOL date,time,and location. Advised patient she may eat a light breakfast/dinner prior to going to L&D. In the interim please report any vaginal bleeding,leakage of fluid,decreased fetal movement or contractions.Reviewed fetal kick counts. Advised to keep all upcoming prenatal visits.

## 2024-10-01 NOTE — TELEPHONE ENCOUNTER
----- Message from SUBHASH Ferreira sent at 10/1/2024 10:08 AM EDT -----  Regarding: mIOL  Procedure to be scheduled (IOL or CS): IOL  NELLY: Estimated Date of Delivery: 10/21/24  Indication for delivery: mild polyhydramnios  Requested date (s) of delivery: 39 weeks or 10/14  Physician preference: none  If IOL, anticipated method: no ripening needed

## 2024-10-01 NOTE — PATIENT INSTRUCTIONS
Patient Education     Pregnancy - The Ninth Month   About this topic   It is important for you to learn how to take care of yourself to help you have a healthy baby and safe delivery. It is good to have health care throughout your pregnancy.  The ninth month of your pregnancy starts around week 37 and lasts through delivery. By knowing how far along you are, you can learn what is normal for this stage of your pregnancy and plan for what is next.  General   Your body   During the ninth month of your pregnancy, here are some things you can expect.  You may:  Lose your mucous plug as your cervix starts to get thinner and dilate.  Notice a small amount of streaky red or pink spotting.  Your doctor may discuss stripping your membranes to help the labor progress.  Go into labor any time. Most women deliver their baby between 38 and 42 weeks.  Notice your belly button sticks out. It should go back to normal after you give birth.  Have a bit of extra energy as you get ready for your baby  Notice your breasts are leaking more. This is normal as your body is making the first milk you can feed your baby.  Have tests to check on how your baby is doing. Your doctor will most likely not let you be pregnant for more than 42 weeks.  Not gain any weight this month. Some mothers even lose 1 to 2 pounds (.45 to .9 kg).  Your baby's growth and development:  Your baby has been busy swallowing fluid and building up waste products for their first bowel movement.  Your baby may start sucking their thumb.  They may come out with dry skin, bruises, or a misshapen head. Living in a watery fluid and going through labor is tough on your baby too. These are all normal and will change in the first weeks of life.  Your baby may have lots of hair on their head or not very much at all. Long fingernails are normal.  Your baby is about 20 inches (51 cm) long and weighs about 7 1/2 pounds (3,400 gm). Your baby is about the size of a watermelon.  Things  to Think About   Avoid alcohol, drugs, tobacco products, and second hand smoke.  Talk to your doctor if you plan to travel or get on a plane.  Have your bag packed so you are ready for delivery.  Are you planning a natural childbirth or thinking about an epidural? Know things you can do to help cope with labor pain.  If you are having a boy, decide if you want to have him circumcised.  Be sure the car seat is installed the right way so you are ready to bring your baby home.  When do I need to call the doctor?   Contractions every 5 minutes or more often that do not go away with rest, drinking water, or position changes  Headache that does not go away; blurry vision; seeing spots or halos; increase in swelling in your hands, feet, or face; and pain under your ribs on the right side  Low, dull back pain that does not go away  Pressure in your pelvis that feels like your baby is pushing down  A gush or constant trickle of watery or bloody fluid leaking from your vagina  Little to no movement felt by baby in 2 hours. Your baby should be moving at least 10 times every 2 hours.  Vaginal bleeding with or without pain  Fever of 100.4°F (38°C) or higher  After a car accident, fall, or any trauma to your belly  Having thoughts of harming yourself or others, or do not feel safe at home  Last Reviewed Date   2020-05-06  Consumer Information Use and Disclaimer   This generalized information is a limited summary of diagnosis, treatment, and/or medication information. It is not meant to be comprehensive and should be used as a tool to help the user understand and/or assess potential diagnostic and treatment options. It does NOT include all information about conditions, treatments, medications, side effects, or risks that may apply to a specific patient. It is not intended to be medical advice or a substitute for the medical advice, diagnosis, or treatment of a health care provider based on the health care provider's examination  and assessment of a patient’s specific and unique circumstances. Patients must speak with a health care provider for complete information about their health, medical questions, and treatment options, including any risks or benefits regarding use of medications. This information does not endorse any treatments or medications as safe, effective, or approved for treating a specific patient. [employer] and its affiliates disclaim any warranty or liability relating to this information or the use thereof. The use of this information is governed by the Terms of Use, available at https://www.woltersFriendFituwer.com/en/know/clinical-effectiveness-terms   Copyright   Copyright © 2024 [employer] and its affiliates and/or licensors. All rights reserved.

## 2024-10-01 NOTE — ASSESSMENT & PLAN NOTE
She feels well. She denies LOF/CTX/VB. She did not voice any concerns. Discussed fetal kick counting.  She was encouraged to continue with her perineal/vaginal massages to prevent lacerations during the labor process but she has not been able to do them the past 2 weeks. Her hubby had a stroke.  Overview charting updated today.

## 2024-10-03 ENCOUNTER — ULTRASOUND (OUTPATIENT)
Dept: PERINATAL CARE | Facility: OTHER | Age: 44
End: 2024-10-03
Payer: COMMERCIAL

## 2024-10-03 VITALS
HEART RATE: 90 BPM | HEIGHT: 64 IN | DIASTOLIC BLOOD PRESSURE: 70 MMHG | SYSTOLIC BLOOD PRESSURE: 118 MMHG | WEIGHT: 207 LBS | BODY MASS INDEX: 35.34 KG/M2

## 2024-10-03 DIAGNOSIS — O09.523 MULTIGRAVIDA OF ADVANCED MATERNAL AGE IN THIRD TRIMESTER: ICD-10-CM

## 2024-10-03 DIAGNOSIS — O09.293 PRIOR PREGNANCY WITH PLACENTA ABRUPTION IN THIRD TRIMESTER, ANTEPARTUM: ICD-10-CM

## 2024-10-03 DIAGNOSIS — O40.3XX0 POLYHYDRAMNIOS AFFECTING PREGNANCY IN THIRD TRIMESTER: ICD-10-CM

## 2024-10-03 DIAGNOSIS — Z3A.37 37 WEEKS GESTATION OF PREGNANCY: Primary | ICD-10-CM

## 2024-10-03 PROCEDURE — 76815 OB US LIMITED FETUS(S): CPT | Performed by: OBSTETRICS & GYNECOLOGY

## 2024-10-03 PROCEDURE — 59025 FETAL NON-STRESS TEST: CPT | Performed by: OBSTETRICS & GYNECOLOGY

## 2024-10-03 NOTE — PROGRESS NOTES
Repeat Non-Stress Testing:    Patient verbalizes +FM. Pt denies ALL:               Leaking of fluid   Contractions   Vaginal bleeding   Decreased fetal movement    Patient is performing daily kick counts. Patient has no questions or concerns.   NST strip reviewed by Dr. Castanon.

## 2024-10-03 NOTE — LETTER
October 3, 2024     Adriana Vega MD  [address]  [address]  [place] [address]    Patient: Jolie Garcia   YOB: 1980   Date of Visit: 10/3/2024       Dear Dr. Vega:    Thank you for referring Jolie Garcia to me for evaluation. Below are my notes for this consultation.    If you have questions, please do not hesitate to call me. I look forward to following your patient along with you.         Sincerely,        Gaurang Castanon MD        CC: No Recipients    Gaurang Castanon MD  10/3/2024 10:57 AM  Sign when Signing Visit  Please refer to the [facility] ultrasound report in Ob Procedures for additional information regarding today's visit

## 2024-10-04 ENCOUNTER — NURSE TRIAGE (OUTPATIENT)
Dept: OTHER | Facility: OTHER | Age: 44
End: 2024-10-04

## 2024-10-04 NOTE — TELEPHONE ENCOUNTER
"Regardin weeks/ Labor symptoms  ----- Message from Cesilia BELLAMY sent at 10/4/2024  6:41 PM EDT -----  \" I believe I might be in early stages of labor, I live a hour away from [place] and would like advice before heading over \"    "

## 2024-10-04 NOTE — TELEPHONE ENCOUNTER
"Reason for Disposition  • [1] History of prior delivery (multipara) AND [2] contractions > 10 minutes OR for < 1 hour    Answer Assessment - Initial Assessment Questions  1. ONSET: \"When did the symptoms begin?\"         10/4 around 1530  2. CONTRACTIONS: \"Describe the contractions that you are having.\" (e.g., duration, frequency, regularity, severity)    4 contractions for the last hour  3. PREGNANCY: \"How many weeks pregnant are you?\"     37W4D  4. NELLY: \"What date are you expecting to deliver?\"      10/21  5. PARITY: \"Have you had a baby before?\" If Yes, ask: \"How long did the labor last?\"        6. FETAL MOVEMENT: \"Has the baby's movement decreased or changed significantly from normal?\"      baseline  7. OTHER SYMPTOMS: \"Do you have any other symptoms?\" (e.g., leaking fluid from vagina, vaginal bleeding, fever, hand/facial swelling)      Lower pelvic pressure, lower rectal pressure    Protocols used: Pregnancy - Labor-Adult-AH    "

## 2024-10-04 NOTE — TELEPHONE ENCOUNTER
Gestation:37W4D  NELLY: 10/21. Induction 10/14   Prior   Baseline FM  Constant pelvic pressure and rectal pressure since 1530, with contractions Q15 mins for the last hour.   No additional symptoms.     On call provider contacted and informed of patient's concerns and status.    Provider recommends first rest and hydration if not attempted. If not resolved with rest and hydration, or worsening symptoms proceed to [place].     Patient informed of provider's recommendation. Verbalized understanding, and will attempt rest and hydration. Agreeable with disposition. No further questions.

## 2024-10-07 PROBLEM — Z3A.38 38 WEEKS GESTATION OF PREGNANCY: Status: ACTIVE | Noted: 2024-03-22

## 2024-10-07 NOTE — PATIENT INSTRUCTIONS
Patient Education     Pregnancy - The Ninth Month   About this topic   It is important for you to learn how to take care of yourself to help you have a healthy baby and safe delivery. It is good to have health care throughout your pregnancy.  The ninth month of your pregnancy starts around week 37 and lasts through delivery. By knowing how far along you are, you can learn what is normal for this stage of your pregnancy and plan for what is next.  General   Your body   During the ninth month of your pregnancy, here are some things you can expect.  You may:  Lose your mucous plug as your cervix starts to get thinner and dilate.  Notice a small amount of streaky red or pink spotting.  Your doctor may discuss stripping your membranes to help the labor progress.  Go into labor any time. Most women deliver their baby between 38 and 42 weeks.  Notice your belly button sticks out. It should go back to normal after you give birth.  Have a bit of extra energy as you get ready for your baby  Notice your breasts are leaking more. This is normal as your body is making the first milk you can feed your baby.  Have tests to check on how your baby is doing. Your doctor will most likely not let you be pregnant for more than 42 weeks.  Not gain any weight this month. Some mothers even lose 1 to 2 pounds (.45 to .9 kg).  Your baby's growth and development:  Your baby has been busy swallowing fluid and building up waste products for their first bowel movement.  Your baby may start sucking their thumb.  They may come out with dry skin, bruises, or a misshapen head. Living in a watery fluid and going through labor is tough on your baby too. These are all normal and will change in the first weeks of life.  Your baby may have lots of hair on their head or not very much at all. Long fingernails are normal.  Your baby is about 20 inches (51 cm) long and weighs about 7 1/2 pounds (3,400 gm). Your baby is about the size of a watermelon.  Things  to Think About   Avoid alcohol, drugs, tobacco products, and second hand smoke.  Talk to your doctor if you plan to travel or get on a plane.  Have your bag packed so you are ready for delivery.  Are you planning a natural childbirth or thinking about an epidural? Know things you can do to help cope with labor pain.  If you are having a boy, decide if you want to have him circumcised.  Be sure the car seat is installed the right way so you are ready to bring your baby home.  When do I need to call the doctor?   Contractions every 5 minutes or more often that do not go away with rest, drinking water, or position changes  Headache that does not go away; blurry vision; seeing spots or halos; increase in swelling in your hands, feet, or face; and pain under your ribs on the right side  Low, dull back pain that does not go away  Pressure in your pelvis that feels like your baby is pushing down  A gush or constant trickle of watery or bloody fluid leaking from your vagina  Little to no movement felt by baby in 2 hours. Your baby should be moving at least 10 times every 2 hours.  Vaginal bleeding with or without pain  Fever of 100.4°F (38°C) or higher  After a car accident, fall, or any trauma to your belly  Having thoughts of harming yourself or others, or do not feel safe at home  Last Reviewed Date   2020-05-06  Consumer Information Use and Disclaimer   This generalized information is a limited summary of diagnosis, treatment, and/or medication information. It is not meant to be comprehensive and should be used as a tool to help the user understand and/or assess potential diagnostic and treatment options. It does NOT include all information about conditions, treatments, medications, side effects, or risks that may apply to a specific patient. It is not intended to be medical advice or a substitute for the medical advice, diagnosis, or treatment of a health care provider based on the health care provider's examination  and assessment of a patient’s specific and unique circumstances. Patients must speak with a health care provider for complete information about their health, medical questions, and treatment options, including any risks or benefits regarding use of medications. This information does not endorse any treatments or medications as safe, effective, or approved for treating a specific patient. [employer] and its affiliates disclaim any warranty or liability relating to this information or the use thereof. The use of this information is governed by the Terms of Use, available at https://www.woltersCabanauwer.com/en/know/clinical-effectiveness-terms   Copyright   Copyright © 2024 [employer] and its affiliates and/or licensors. All rights reserved.

## 2024-10-08 ENCOUNTER — ROUTINE PRENATAL (OUTPATIENT)
Age: 44
End: 2024-10-08
Payer: COMMERCIAL

## 2024-10-08 VITALS
HEART RATE: 86 BPM | WEIGHT: 208 LBS | DIASTOLIC BLOOD PRESSURE: 70 MMHG | BODY MASS INDEX: 35.7 KG/M2 | SYSTOLIC BLOOD PRESSURE: 110 MMHG

## 2024-10-08 DIAGNOSIS — O09.293 PRIOR PREGNANCY WITH PLACENTA ABRUPTION IN THIRD TRIMESTER, ANTEPARTUM: ICD-10-CM

## 2024-10-08 DIAGNOSIS — O09.523 MULTIGRAVIDA OF ADVANCED MATERNAL AGE IN THIRD TRIMESTER: ICD-10-CM

## 2024-10-08 DIAGNOSIS — A60.9 HSV (HERPES SIMPLEX VIRUS) ANOGENITAL INFECTION: ICD-10-CM

## 2024-10-08 DIAGNOSIS — O40.3XX0 POLYHYDRAMNIOS AFFECTING PREGNANCY IN THIRD TRIMESTER: ICD-10-CM

## 2024-10-08 DIAGNOSIS — O09.293 HISTORY OF PRIOR PREGNANCY WITH SHORT CERVIX, CURRENTLY PREGNANT IN THIRD TRIMESTER: ICD-10-CM

## 2024-10-08 DIAGNOSIS — Z34.83 PRENATAL CARE, SUBSEQUENT PREGNANCY, THIRD TRIMESTER: Primary | ICD-10-CM

## 2024-10-08 DIAGNOSIS — Z3A.38 38 WEEKS GESTATION OF PREGNANCY: ICD-10-CM

## 2024-10-08 LAB
SL AMB  POCT GLUCOSE, UA: NORMAL
SL AMB POCT URINE PROTEIN: NORMAL

## 2024-10-08 PROCEDURE — 81002 URINALYSIS NONAUTO W/O SCOPE: CPT | Performed by: NURSE PRACTITIONER

## 2024-10-08 PROCEDURE — 99213 OFFICE O/P EST LOW 20 MIN: CPT | Performed by: NURSE PRACTITIONER

## 2024-10-08 NOTE — ASSESSMENT & PLAN NOTE
She feels well. She denies LOF/CTX/VB. She did not voice any concerns. Discussed fetal kick counting.  She was encouraged to continue with her perineal/vaginal massages to prevent lacerations during the labor process. (Hasn't been doing them)  Overview charting updated today.

## 2024-10-08 NOTE — PROGRESS NOTES
Problem   38 Weeks Gestation of Pregnancy    Blood Type:  A+    Antibody screen: neg  Pap done 4/25  GC/CT -  neg  PN1 Labs- done  28 Week Labs- anemia, on iron, recheck done in September (labs still show anemia, improved ferritin    Blue folder- reviewed  Yellow folder- reviewed    MatT21 neg  AFP- negative  Level 1 5/9  Level 2- 6/6  FG on 8/1 at 28 weeks  FG on 9/12 at 34 wks  NSTs with JAMES weekly for mild polyhydramnios    TDAP - declined  RSV- declined  Flu- declined    Delivery consent- signed  Breast pump - has  Pediatrician - has  L&D forms- submitted     Perineal massage -  reinforced  GBS swab - neg  mIOL - consent signed, 10/14 at 7am             38 weeks gestation of pregnancy  She feels well. She denies LOF/CTX/VB. She did not voice any concerns. Discussed fetal kick counting.  She was encouraged to continue with her perineal/vaginal massages to prevent lacerations during the labor process. (Hasn't been doing them)  Overview charting updated today.

## 2024-10-10 ENCOUNTER — HOSPITAL ENCOUNTER (INPATIENT)
Facility: HOSPITAL | Age: 44
LOS: 2 days | Discharge: HOME | DRG: 542 | End: 2024-10-12
Attending: STUDENT IN AN ORGANIZED HEALTH CARE EDUCATION/TRAINING PROGRAM | Admitting: STUDENT IN AN ORGANIZED HEALTH CARE EDUCATION/TRAINING PROGRAM
Payer: COMMERCIAL

## 2024-10-10 ENCOUNTER — ANESTHESIA EVENT (INPATIENT)
Dept: LABOR AND DELIVERY | Facility: HOSPITAL | Age: 44
End: 2024-10-10
Payer: COMMERCIAL

## 2024-10-10 ENCOUNTER — ANESTHESIA (INPATIENT)
Dept: LABOR AND DELIVERY | Facility: HOSPITAL | Age: 44
End: 2024-10-10
Payer: COMMERCIAL

## 2024-10-10 ENCOUNTER — NURSE TRIAGE (OUTPATIENT)
Age: 44
End: 2024-10-10

## 2024-10-10 DIAGNOSIS — Z3A.38 38 WEEKS GESTATION OF PREGNANCY: Primary | ICD-10-CM

## 2024-10-10 LAB
ABO GROUP BLD: NORMAL
BASE EXCESS BLDCOA CALC-SCNC: -6.7 MMOL/L (ref 3–11)
BASE EXCESS BLDCOV CALC-SCNC: -5.6 MMOL/L (ref 1–9)
BLD GP AB SCN SERPL QL: NEGATIVE
ERYTHROCYTE [DISTWIDTH] IN BLOOD BY AUTOMATED COUNT: 14.8 % (ref 11.6–15.1)
HCO3 BLDCOA-SCNC: 21.6 MMOL/L (ref 17.3–27.3)
HCO3 BLDCOV-SCNC: 22.1 MMOL/L (ref 12.2–28.6)
HCT VFR BLD AUTO: 35.3 % (ref 34.8–46.1)
HGB BLD-MCNC: 11.7 G/DL (ref 11.5–15.4)
HOLD SPECIMEN: NORMAL
MCH RBC QN AUTO: 29.8 PG (ref 26.8–34.3)
MCHC RBC AUTO-ENTMCNC: 33.1 G/DL (ref 31.4–37.4)
MCV RBC AUTO: 90 FL (ref 82–98)
O2 CT VFR BLDCOA CALC: 3.5 ML/DL
OXYHGB MFR BLDCOA: 19.6 %
OXYHGB MFR BLDCOV: 26.5 %
PCO2 BLDCOA: 55.6 MM[HG] (ref 30–60)
PCO2 BLDCOV: 52 MM HG (ref 27–43)
PH BLDCOA: 7.21 [PH] (ref 7.23–7.43)
PH BLDCOV: 7.25 [PH] (ref 7.19–7.49)
PLATELET # BLD AUTO: 276 THOUSANDS/UL (ref 149–390)
PMV BLD AUTO: 8.5 FL (ref 8.9–12.7)
PO2 BLDCOA: 13.2 MM HG (ref 5–25)
PO2 BLDCOV: 15.8 MM HG (ref 15–45)
RBC # BLD AUTO: 3.93 MILLION/UL (ref 3.81–5.12)
RH BLD: POSITIVE
SAO2 % BLDCOV: 5.2 ML/DL
SPECIMEN EXPIRATION DATE: NORMAL
TREPONEMA PALLIDUM IGG+IGM AB [PRESENCE] IN SERUM OR PLASMA BY IMMUNOASSAY: NORMAL
WBC # BLD AUTO: 15.12 THOUSAND/UL (ref 4.31–10.16)

## 2024-10-10 PROCEDURE — 86920 COMPATIBILITY TEST SPIN: CPT

## 2024-10-10 PROCEDURE — 0DQR0ZZ REPAIR ANAL SPHINCTER, OPEN APPROACH: ICD-10-PCS | Performed by: STUDENT IN AN ORGANIZED HEALTH CARE EDUCATION/TRAINING PROGRAM

## 2024-10-10 PROCEDURE — 86780 TREPONEMA PALLIDUM: CPT

## 2024-10-10 PROCEDURE — 85027 COMPLETE CBC AUTOMATED: CPT

## 2024-10-10 PROCEDURE — 86850 RBC ANTIBODY SCREEN: CPT

## 2024-10-10 PROCEDURE — 30233N1 TRANSFUSION OF NONAUTOLOGOUS RED BLOOD CELLS INTO PERIPHERAL VEIN, PERCUTANEOUS APPROACH: ICD-10-PCS | Performed by: STUDENT IN AN ORGANIZED HEALTH CARE EDUCATION/TRAINING PROGRAM

## 2024-10-10 PROCEDURE — 86900 BLOOD TYPING SEROLOGIC ABO: CPT

## 2024-10-10 PROCEDURE — 59409 OBSTETRICAL CARE: CPT | Performed by: STUDENT IN AN ORGANIZED HEALTH CARE EDUCATION/TRAINING PROGRAM

## 2024-10-10 PROCEDURE — 86901 BLOOD TYPING SEROLOGIC RH(D): CPT

## 2024-10-10 PROCEDURE — NC001 PR NO CHARGE: Performed by: STUDENT IN AN ORGANIZED HEALTH CARE EDUCATION/TRAINING PROGRAM

## 2024-10-10 PROCEDURE — 82805 BLOOD GASES W/O2 SATURATION: CPT | Performed by: STUDENT IN AN ORGANIZED HEALTH CARE EDUCATION/TRAINING PROGRAM

## 2024-10-10 PROCEDURE — 99213 OFFICE O/P EST LOW 20 MIN: CPT

## 2024-10-10 RX ORDER — CEFOXITIN SODIUM 2 G/50ML
2000 INJECTION, SOLUTION INTRAVENOUS ONCE
Status: COMPLETED | OUTPATIENT
Start: 2024-10-10 | End: 2024-10-10

## 2024-10-10 RX ORDER — IBUPROFEN 600 MG/1
600 TABLET, FILM COATED ORAL EVERY 6 HOURS
Status: CANCELLED | OUTPATIENT
Start: 2024-10-10

## 2024-10-10 RX ORDER — SUCCINYLCHOLINE/SOD CL,ISO/PF 100 MG/5ML
SYRINGE (ML) INTRAVENOUS AS NEEDED
Status: DISCONTINUED | OUTPATIENT
Start: 2024-10-10 | End: 2024-10-10

## 2024-10-10 RX ORDER — VALACYCLOVIR HYDROCHLORIDE 500 MG/1
500 TABLET, FILM COATED ORAL 2 TIMES DAILY
Status: DISCONTINUED | OUTPATIENT
Start: 2024-10-10 | End: 2024-10-12 | Stop reason: HOSPADM

## 2024-10-10 RX ORDER — SENNOSIDES 8.6 MG
1 TABLET ORAL DAILY
Status: DISCONTINUED | OUTPATIENT
Start: 2024-10-11 | End: 2024-10-12 | Stop reason: HOSPADM

## 2024-10-10 RX ORDER — SODIUM CHLORIDE, SODIUM LACTATE, POTASSIUM CHLORIDE, CALCIUM CHLORIDE 600; 310; 30; 20 MG/100ML; MG/100ML; MG/100ML; MG/100ML
125 INJECTION, SOLUTION INTRAVENOUS CONTINUOUS
Status: DISCONTINUED | OUTPATIENT
Start: 2024-10-10 | End: 2024-10-10

## 2024-10-10 RX ORDER — HYDROMORPHONE HCL/PF 1 MG/ML
1 SYRINGE (ML) INJECTION
Status: DISCONTINUED | OUTPATIENT
Start: 2024-10-10 | End: 2024-10-10

## 2024-10-10 RX ORDER — METHYLERGONOVINE MALEATE 0.2 MG/ML
INJECTION INTRAVENOUS AS NEEDED
Status: DISCONTINUED | OUTPATIENT
Start: 2024-10-10 | End: 2024-10-10

## 2024-10-10 RX ORDER — FENTANYL CITRATE 50 UG/ML
50 INJECTION, SOLUTION INTRAMUSCULAR; INTRAVENOUS ONCE
Status: COMPLETED | OUTPATIENT
Start: 2024-10-10 | End: 2024-10-10

## 2024-10-10 RX ORDER — SIMETHICONE 80 MG
80 TABLET,CHEWABLE ORAL 4 TIMES DAILY PRN
Status: DISCONTINUED | OUTPATIENT
Start: 2024-10-10 | End: 2024-10-12 | Stop reason: HOSPADM

## 2024-10-10 RX ORDER — MEPERIDINE HYDROCHLORIDE 25 MG/ML
12.5 INJECTION INTRAMUSCULAR; INTRAVENOUS; SUBCUTANEOUS
Status: DISCONTINUED | OUTPATIENT
Start: 2024-10-10 | End: 2024-10-12 | Stop reason: HOSPADM

## 2024-10-10 RX ORDER — LIDOCAINE HYDROCHLORIDE 10 MG/ML
INJECTION, SOLUTION EPIDURAL; INFILTRATION; INTRACAUDAL; PERINEURAL
Status: DISPENSED
Start: 2024-10-10 | End: 2024-10-11

## 2024-10-10 RX ORDER — ACETAMINOPHEN 325 MG/1
975 TABLET ORAL EVERY 6 HOURS
Status: CANCELLED | OUTPATIENT
Start: 2024-10-10

## 2024-10-10 RX ORDER — FENTANYL CITRATE 50 UG/ML
INJECTION, SOLUTION INTRAMUSCULAR; INTRAVENOUS
Status: COMPLETED
Start: 2024-10-10 | End: 2024-10-10

## 2024-10-10 RX ORDER — FENTANYL CITRATE 50 UG/ML
INJECTION, SOLUTION INTRAMUSCULAR; INTRAVENOUS AS NEEDED
Status: DISCONTINUED | OUTPATIENT
Start: 2024-10-10 | End: 2024-10-10

## 2024-10-10 RX ORDER — FENTANYL CITRATE/PF 50 MCG/ML
50 SYRINGE (ML) INJECTION
Status: COMPLETED | OUTPATIENT
Start: 2024-10-10 | End: 2024-10-10

## 2024-10-10 RX ORDER — OXYTOCIN/RINGER'S LACTATE 30/500 ML
250 PLASTIC BAG, INJECTION (ML) INTRAVENOUS ONCE
Status: COMPLETED | OUTPATIENT
Start: 2024-10-10 | End: 2024-10-10

## 2024-10-10 RX ORDER — PROMETHAZINE HYDROCHLORIDE 25 MG/ML
12.5 INJECTION, SOLUTION INTRAMUSCULAR; INTRAVENOUS
Status: DISCONTINUED | OUTPATIENT
Start: 2024-10-10 | End: 2024-10-12 | Stop reason: HOSPADM

## 2024-10-10 RX ORDER — OXYCODONE HYDROCHLORIDE 5 MG/1
5 TABLET ORAL EVERY 4 HOURS PRN
Status: DISCONTINUED | OUTPATIENT
Start: 2024-10-10 | End: 2024-10-12 | Stop reason: HOSPADM

## 2024-10-10 RX ORDER — CALCIUM CARBONATE 500 MG/1
1000 TABLET, CHEWABLE ORAL 3 TIMES DAILY PRN
Status: DISCONTINUED | OUTPATIENT
Start: 2024-10-10 | End: 2024-10-12 | Stop reason: HOSPADM

## 2024-10-10 RX ORDER — OXYTOCIN/RINGER'S LACTATE 30/500 ML
62.5 PLASTIC BAG, INJECTION (ML) INTRAVENOUS CONTINUOUS
Status: ACTIVE | OUTPATIENT
Start: 2024-10-10 | End: 2024-10-11

## 2024-10-10 RX ORDER — BUPIVACAINE HYDROCHLORIDE 2.5 MG/ML
30 INJECTION, SOLUTION EPIDURAL; INFILTRATION; INTRACAUDAL ONCE AS NEEDED
Status: DISCONTINUED | OUTPATIENT
Start: 2024-10-10 | End: 2024-10-10

## 2024-10-10 RX ORDER — KETOROLAC TROMETHAMINE 30 MG/ML
30 INJECTION, SOLUTION INTRAMUSCULAR; INTRAVENOUS EVERY 6 HOURS SCHEDULED
Status: COMPLETED | OUTPATIENT
Start: 2024-10-10 | End: 2024-10-11

## 2024-10-10 RX ORDER — HYDROMORPHONE HCL/PF 1 MG/ML
SYRINGE (ML) INJECTION AS NEEDED
Status: DISCONTINUED | OUTPATIENT
Start: 2024-10-10 | End: 2024-10-10

## 2024-10-10 RX ORDER — OXYTOCIN/RINGER'S LACTATE 30/500 ML
PLASTIC BAG, INJECTION (ML) INTRAVENOUS
Status: COMPLETED
Start: 2024-10-10 | End: 2024-10-11

## 2024-10-10 RX ORDER — KETOROLAC TROMETHAMINE 30 MG/ML
INJECTION, SOLUTION INTRAMUSCULAR; INTRAVENOUS AS NEEDED
Status: DISCONTINUED | OUTPATIENT
Start: 2024-10-10 | End: 2024-10-10

## 2024-10-10 RX ORDER — DOCUSATE SODIUM 100 MG/1
100 CAPSULE, LIQUID FILLED ORAL 2 TIMES DAILY
Status: DISCONTINUED | OUTPATIENT
Start: 2024-10-10 | End: 2024-10-12 | Stop reason: HOSPADM

## 2024-10-10 RX ORDER — OXYTOCIN/RINGER'S LACTATE 30/500 ML
PLASTIC BAG, INJECTION (ML) INTRAVENOUS CONTINUOUS PRN
Status: DISCONTINUED | OUTPATIENT
Start: 2024-10-10 | End: 2024-10-10

## 2024-10-10 RX ORDER — METOCLOPRAMIDE HYDROCHLORIDE 5 MG/ML
5 INJECTION INTRAMUSCULAR; INTRAVENOUS
Status: DISCONTINUED | OUTPATIENT
Start: 2024-10-10 | End: 2024-10-12 | Stop reason: HOSPADM

## 2024-10-10 RX ORDER — ACETAMINOPHEN 325 MG/1
975 TABLET ORAL EVERY 8 HOURS SCHEDULED
Status: DISCONTINUED | OUTPATIENT
Start: 2024-10-11 | End: 2024-10-12 | Stop reason: HOSPADM

## 2024-10-10 RX ORDER — OXYCODONE HYDROCHLORIDE 10 MG/1
10 TABLET ORAL EVERY 4 HOURS PRN
Status: DISCONTINUED | OUTPATIENT
Start: 2024-10-10 | End: 2024-10-12 | Stop reason: HOSPADM

## 2024-10-10 RX ORDER — HYDROMORPHONE HCL/PF 1 MG/ML
0.5 SYRINGE (ML) INJECTION
Status: COMPLETED | OUTPATIENT
Start: 2024-10-10 | End: 2024-10-10

## 2024-10-10 RX ORDER — ONDANSETRON 2 MG/ML
4 INJECTION INTRAMUSCULAR; INTRAVENOUS EVERY 6 HOURS PRN
Status: DISCONTINUED | OUTPATIENT
Start: 2024-10-10 | End: 2024-10-10

## 2024-10-10 RX ORDER — DEXAMETHASONE SODIUM PHOSPHATE 10 MG/ML
INJECTION, SOLUTION INTRAMUSCULAR; INTRAVENOUS AS NEEDED
Status: DISCONTINUED | OUTPATIENT
Start: 2024-10-10 | End: 2024-10-10

## 2024-10-10 RX ORDER — LIDOCAINE HYDROCHLORIDE 10 MG/ML
INJECTION, SOLUTION EPIDURAL; INFILTRATION; INTRACAUDAL; PERINEURAL AS NEEDED
Status: DISCONTINUED | OUTPATIENT
Start: 2024-10-10 | End: 2024-10-10

## 2024-10-10 RX ORDER — BENZOCAINE/MENTHOL 6 MG-10 MG
1 LOZENGE MUCOUS MEMBRANE DAILY PRN
Status: DISCONTINUED | OUTPATIENT
Start: 2024-10-10 | End: 2024-10-12 | Stop reason: HOSPADM

## 2024-10-10 RX ORDER — HYDROMORPHONE HCL/PF 1 MG/ML
1 SYRINGE (ML) INJECTION ONCE
Status: DISCONTINUED | OUTPATIENT
Start: 2024-10-10 | End: 2024-10-10

## 2024-10-10 RX ORDER — DIPHENHYDRAMINE HYDROCHLORIDE 50 MG/ML
25 INJECTION INTRAMUSCULAR; INTRAVENOUS EVERY 6 HOURS PRN
Status: DISCONTINUED | OUTPATIENT
Start: 2024-10-10 | End: 2024-10-12 | Stop reason: HOSPADM

## 2024-10-10 RX ORDER — ONDANSETRON 2 MG/ML
4 INJECTION INTRAMUSCULAR; INTRAVENOUS EVERY 8 HOURS PRN
Status: DISCONTINUED | OUTPATIENT
Start: 2024-10-10 | End: 2024-10-12 | Stop reason: HOSPADM

## 2024-10-10 RX ORDER — CEFOXITIN SODIUM 2 G/50ML
2000 INJECTION, SOLUTION INTRAVENOUS ONCE
Status: COMPLETED | OUTPATIENT
Start: 2024-10-11 | End: 2024-10-11

## 2024-10-10 RX ORDER — POLYETHYLENE GLYCOL 3350 17 G/17G
17 POWDER, FOR SOLUTION ORAL DAILY
Status: DISCONTINUED | OUTPATIENT
Start: 2024-10-11 | End: 2024-10-12 | Stop reason: HOSPADM

## 2024-10-10 RX ORDER — DIPHENHYDRAMINE HYDROCHLORIDE 50 MG/ML
50 INJECTION INTRAMUSCULAR; INTRAVENOUS EVERY 6 HOURS PRN
Status: DISCONTINUED | OUTPATIENT
Start: 2024-10-10 | End: 2024-10-10 | Stop reason: SDUPTHER

## 2024-10-10 RX ORDER — PROPOFOL 10 MG/ML
INJECTION, EMULSION INTRAVENOUS AS NEEDED
Status: DISCONTINUED | OUTPATIENT
Start: 2024-10-10 | End: 2024-10-10

## 2024-10-10 RX ORDER — ACETAMINOPHEN 10 MG/ML
1000 INJECTION, SOLUTION INTRAVENOUS EVERY 8 HOURS SCHEDULED
Status: COMPLETED | OUTPATIENT
Start: 2024-10-10 | End: 2024-10-11

## 2024-10-10 RX ORDER — IBUPROFEN 600 MG/1
600 TABLET, FILM COATED ORAL EVERY 6 HOURS SCHEDULED
Status: DISCONTINUED | OUTPATIENT
Start: 2024-10-11 | End: 2024-10-12 | Stop reason: HOSPADM

## 2024-10-10 RX ADMIN — SODIUM CHLORIDE, POTASSIUM CHLORIDE, SODIUM LACTATE AND CALCIUM CHLORIDE 125 ML/HR: 600; 310; 30; 20 INJECTION, SOLUTION INTRAVENOUS at 18:27

## 2024-10-10 RX ADMIN — FENTANYL CITRATE 50 MCG: 50 INJECTION, SOLUTION INTRAMUSCULAR; INTRAVENOUS at 15:40

## 2024-10-10 RX ADMIN — ACETAMINOPHEN 1000 MG: 10 INJECTION INTRAVENOUS at 19:32

## 2024-10-10 RX ADMIN — LIDOCAINE HYDROCHLORIDE 50 MG: 10 INJECTION, SOLUTION EPIDURAL; INFILTRATION; INTRACAUDAL at 15:52

## 2024-10-10 RX ADMIN — Medication 250 MILLI-UNITS/MIN: at 15:45

## 2024-10-10 RX ADMIN — ONDANSETRON 4 MG: 2 INJECTION INTRAMUSCULAR; INTRAVENOUS at 16:52

## 2024-10-10 RX ADMIN — FENTANYL CITRATE 50 MCG: 50 INJECTION INTRAMUSCULAR; INTRAVENOUS at 15:40

## 2024-10-10 RX ADMIN — PROPOFOL 150 MG: 10 INJECTION, EMULSION INTRAVENOUS at 15:52

## 2024-10-10 RX ADMIN — Medication 100 MG: at 15:52

## 2024-10-10 RX ADMIN — DEXAMETHASONE SODIUM PHOSPHATE 10 MG: 10 INJECTION, SOLUTION INTRAMUSCULAR; INTRAVENOUS at 15:53

## 2024-10-10 RX ADMIN — Medication 250 MILLI-UNITS/MIN: at 16:53

## 2024-10-10 RX ADMIN — BENZOCAINE AND LEVOMENTHOL 1 APPLICATION: 200; 5 SPRAY TOPICAL at 20:25

## 2024-10-10 RX ADMIN — HYDROMORPHONE HYDROCHLORIDE 0.5 MG: 1 INJECTION, SOLUTION INTRAMUSCULAR; INTRAVENOUS; SUBCUTANEOUS at 18:44

## 2024-10-10 RX ADMIN — METHYLERGONOVINE MALEATE 0.2 MG: 0.2 INJECTION INTRAVENOUS at 16:01

## 2024-10-10 RX ADMIN — DOCUSATE SODIUM 100 MG: 100 CAPSULE, LIQUID FILLED ORAL at 20:25

## 2024-10-10 RX ADMIN — HYDROCORTISONE 1 APPLICATION: 1 CREAM TOPICAL at 20:25

## 2024-10-10 RX ADMIN — Medication 250 MILLI-UNITS/MIN: at 15:33

## 2024-10-10 RX ADMIN — KETOROLAC TROMETHAMINE 30 MG: 30 INJECTION, SOLUTION INTRAMUSCULAR; INTRAVENOUS at 23:50

## 2024-10-10 RX ADMIN — Medication 62.5 MILLI-UNITS/MIN: at 17:31

## 2024-10-10 RX ADMIN — KETOROLAC TROMETHAMINE 15 MG: 30 INJECTION, SOLUTION INTRAMUSCULAR; INTRAVENOUS at 17:12

## 2024-10-10 RX ADMIN — FENTANYL CITRATE 50 MCG: 50 INJECTION INTRAMUSCULAR; INTRAVENOUS at 17:57

## 2024-10-10 RX ADMIN — HYDROMORPHONE HYDROCHLORIDE 0.5 MG: 1 INJECTION, SOLUTION INTRAMUSCULAR; INTRAVENOUS; SUBCUTANEOUS at 16:14

## 2024-10-10 RX ADMIN — WITCH HAZEL 1 PAD: 500 SOLUTION RECTAL; TOPICAL at 20:25

## 2024-10-10 RX ADMIN — FENTANYL CITRATE 100 MCG: 50 INJECTION INTRAMUSCULAR; INTRAVENOUS at 15:52

## 2024-10-10 RX ADMIN — SODIUM CHLORIDE, POTASSIUM CHLORIDE, SODIUM LACTATE AND CALCIUM CHLORIDE 125 ML/HR: 600; 310; 30; 20 INJECTION, SOLUTION INTRAVENOUS at 14:00

## 2024-10-10 RX ADMIN — HYDROMORPHONE HYDROCHLORIDE 0.5 MG: 1 INJECTION, SOLUTION INTRAMUSCULAR; INTRAVENOUS; SUBCUTANEOUS at 18:11

## 2024-10-10 RX ADMIN — FENTANYL CITRATE 50 MCG: 50 INJECTION INTRAMUSCULAR; INTRAVENOUS at 17:43

## 2024-10-10 RX ADMIN — CEFOXITIN SODIUM 2000 MG: 2 INJECTION, SOLUTION INTRAVENOUS at 15:45

## 2024-10-10 NOTE — H&P
H & P- Obstetrics   Jolie Garcia 44 y.o. female MRN: [id number]  Unit/Bed#: LD TRIAGE 3- Encounter: [id number]    Assessment: 44 y.o.  at 38w3d admitted for labor.  SVE: 4.5/70/-2  FHT: Cat I  [place]: ctx q2min    Clinical EFW: 87%ile @34w3d  Presentation: vertex, confirmed on SVE  GBS status: neg    Plan:   * 38 weeks gestation of pregnancy  Assessment & Plan  Patient presented with strong, regular contractions suspicious for labor. SVE 2.5/50/-2 -> 4.5/70/-2, toco with contractions q2min, FHR Cat I  - admit for labor  - IV fluids, clear liquid diet  - labs: CBC, T/S, RPR  - analgesia at maternal request  - management: expectant, anticipate       HSV (herpes simplex virus) anogenital infection  Assessment & Plan  No lesions visualized on speculum exam; can proceed with vaginal delivery  - Valtrex 500mg BID    Discussed case and plan w/ Dr. Vega    Chief Complaint: contractions    HPI: Jolie Garcia is a 44 y.o.  with an NELLY of 10/21/2024, by Last Menstrual Period at 38w3d who is being admitted for labor. 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 She reports painful contractions beginning at 0800 this morning, has moderate LOF since 1900 on 10/9/24, and reports no VB. She states she has felt good FM.    Patient Active Problem List   Diagnosis    Endometriosis    HSV (herpes simplex virus) anogenital infection    Hereditary disease in family possibly affecting fetus    At risk for aneuploidy    History of poor pregnancy outcome    38 weeks gestation of pregnancy    History of prior pregnancy with short cervix, currently pregnant in third trimester    Prior pregnancy with placenta abruption in third trimester, antepartum    Multigravida of advanced maternal age in third trimester    Polyhydramnios affecting pregnancy in third trimester     OB Hx:  OB History    Para Term  AB Living   2 1  "1 0 0 1   SAB IAB Ectopic Multiple Live Births   0 0 0 0 1      # Outcome Date GA Lbr Paul/2nd Weight Sex Type Anes PTL Lv   2 Current            1 Term 19 38w0d  3175 g (7 lb) M Vag-Vacuum None N BECKIE      Birth Comments: possible abruption based on description rapid labor, heavy bleeding with clotting problems (?DIC) and \"placenta\" problem      Complications: Abruptio Placenta, Cervical shortening     Past Medical Hx:  Past Medical History:   Diagnosis Date    Abnormal Pap smear of cervix     HPV    Endometriosis     Genital herpes     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, NOT on Pap testing    Varicella      Past Surgical hx:  Past Surgical History:   Procedure Laterality Date    EXAMINATION UNDER ANESTHESIA      with delievery of her son in [place]-    TONSILLECTOMY      WISDOM TOOTH EXTRACTION       Social History     Socioeconomic History    Marital status: Single     Spouse name: Not on file    Number of children: Not on file    Years of education: Not on file    Highest education level: Not on file   Occupational History    Not on file   Tobacco Use    Smoking status: Former     Current packs/day: 0.00     Average packs/day: 1 pack/day for 10.0 years (10.0 ttl pk-yrs)     Types: Cigarettes     Start date:      Quit date: 2015     Years since quittin.2    Smokeless tobacco: Never   Vaping Use    Vaping status: Never Used   Substance and Sexual Activity    Alcohol use: Not Currently     Comment: On occasion, not a drinker    Drug use: Never    Sexual activity: Yes     Partners: Male     Birth control/protection: None     Comment: Gm García   Other Topics Concern    Not on file   Social History Narrative    Not on file     Social Determinants of Health     Financial Resource Strain: Not on file   Food Insecurity: No Food Insecurity (2024)    Hunger Vital Sign     Worried About Running Out of Food in the Last Year: Never true     Ran Out of Food in the Last Year: " Never true   Transportation Needs: No Transportation Needs (5/16/2024)    PRAPARE - Transportation     Lack of Transportation (Medical): No     Lack of Transportation (Non-Medical): No   Physical Activity: Not on file   Stress: Not on file   Social Connections: Unknown (6/18/2024)    Received from [facility]    Social Connections     How often do you feel lonely or isolated from those around you? (Adult - for ages 18 years and over): Not on file   Intimate Partner Violence: Not on file   Housing Stability: Low Risk  (5/16/2024)    Housing Stability Vital Sign     Unable to Pay for Housing in the Last Year: No     Number of Times Moved in the Last Year: 0     Homeless in the Last Year: No     Allergies   Allergen Reactions    Penicillins Rash       Medications Prior to Admission:     ferrous sulfate 325 (65 FE) MG EC tablet    Prenatal MV-Min-Fe Fum-FA-DHA (PRENATAL 1 PO)    valACYclovir (VALTREX) 500 mg tablet    Objective:  HR:  [71] 71  BP: (115)/(66) 115/66  There is no height or weight on file to calculate BMI.     Physical Exam:  Physical Exam  Vitals reviewed.   Constitutional:       Appearance: She is well-developed.      Comments: Appears uncomfortable 2/2 contraction pain   HENT:      Head: Normocephalic and atraumatic.   Cardiovascular:      Rate and Rhythm: Normal rate.   Pulmonary:      Effort: Pulmonary effort is normal. No respiratory distress.   Skin:     Findings: No rash (on exposed skin).   Neurological:      Mental Status: She is alert and oriented to person, place, and time.   Psychiatric:         Mood and Affect: Affect normal.         Speech: Speech normal.         Behavior: Behavior normal.        FHT:  Cat I    TOCO:   [place]: ctx q2min    Lab Results   Component Value Date    WBC 10.28 (H) 09/09/2024    HGB 10.5 (L) 09/09/2024    HCT 33.0 (L) 09/09/2024     09/09/2024     Lab Results   Component Value Date    K 4.3 11/04/2021     11/04/2021    CO2 21 (L) 11/04/2021    BUN 14  11/04/2021    CREATININE 0.7 11/04/2021    AST 15 11/04/2021    ALT 14 11/04/2021     Prenatal Labs:   Blood type: A pos  Antibody: neg  GBS: neg  HIV: nr  Rubella: immune  Syphilis IgM/IgG: nr  HBsAg: nr  HCAb: nr  Chlamydia: neg  Gonorrhea: neg  Diabetes 1 hour screen: wnl  3 hour glucose: n/a  Platelets: pending  Hgb: pending  Expected length of stay: >2 Midnights  Admission status: INPATIENT     Tony Jackson MD  OBGYN PGY-1  10/10/24  1:27 PM

## 2024-10-10 NOTE — L&D DELIVERY NOTE
Vaginal Delivery Summary - OB/GYN   Jolie Garcia 44 y.o. female MRN: [id number]  Unit/Bed#: LD PACU-02 Encounter: [id number]    Pre-delivery Diagnosis:   Pregnancy at 38w3d   Hx of HSV  Endometriosis    Post-delivery Diagnosis:   Forceps-assisted vaginal delivery  S/p repair of third degree perineal laceration    Procedure: Spontaneous Vaginal Delivery, Repair of 3c perineal laceration    Attending: Dr. Vega    Assistant(s): Dr. Patterson, Dr. Jackson    Anesthesia: none for delivery     QBL: 364 mL, including QBL from laceration repair (see separate operative report)     Complications: Third degree laceration    Specimens:   1. Arterial and venous cord gases  2. Cord blood  3. Segment of umbilical cord  4. Placenta to storage     Findings:  1. Viable male on 10/10/2024 at 1532, with APGARS of 8 and 9 at 1 and 5 minutes respectively,  2. Spontaneous delivery of intact placenta at 1558 (in OR for laceration repair)  3. Third degree laceration repaired in OR under general anesthesia - see separate operative report  4. Umbilical Cord Venous Blood Gas:  Results from last 7 days   Lab Units 10/10/24  1537   PH COV  7.246   PCO2 COV mm HG 52.0*   HCO3 COV mmol/L 22.1   BASE EXC COV mmol/L -5.6*   O2 CT CD VB mL/dL 5.2   O2 HGB, VENOUS CORD % 26.5     5. Umbilical Cord Arterial Blood Gas:  Results from last 7 days   Lab Units 10/10/24  1537   PH COA  7.207*   PCO2 COA  55.6   PO2 COA mm HG 13.2   HCO3 COA mmol/L 21.6   BASE EXC COA mmol/L -6.7*   O2 CONTENT CORD ART ml/dl 3.5   O2 HGB, ARTERIAL CORD % 19.6     Disposition:  Patient tolerated the procedure well and was recovering in PACU.    Brief history, labor course, and description of procedure:  Ms. Jolie Garcia is a 44 y.o.  at 38wk3d. She presented to labor and delivery for uterine contractions. On presentation, her cervical exam was 4.5/70/-2, which was marked change from her last exam in the office a few days prior. She was admitted for labor.  Her pregnancy was also complicated by history of HSV on Valtrex suppression; no lesions were noted on speculum exam. She declined epidural analgesia. She progressed to 8 cm of dilation and was unable to resist bearing down. Her legs were placed into stirrups and amniotomy was performed for clear fluid. She was then found to be 9cm, but was bearing down despite counseling against this. Cervical massage was performed over 2 contractions, after which she was noted to be completely dilated and was instructed to start actively pushing. FHT were noted to drop immediately to the 90s bpm with the initiation of active pushing.     Patient was informed that at this time there was concern for fetal well-being, and recommendation was made to proceed with forceps delivery. She was provided with the alternative of a pLTCS and elected for forceps delivery. Indication for forceps was terminal bradycardia. Patient was complete and +2 station, baby was direct OP. Patient was bryan q1-2 min. Patient was felt to have high likelihood for successful operative vaginal delivery. Patient was informed of risks and benefits of the procedure, including lack of pain control for same. Patient consented to proceed with forceps delivery despite lack of epidural analgesia due to concern for fetal wellbeing.     The patient's bladder was emptied to avoid injury. The fetal position was checked and reconfirmed to be direct OP at +2 station. Garcia Riggs forceps were used.  The posterior blade was inserted first followed by the anterior blade along the occipital diameter and equidistance from the sagittal suture. There was one fingerbreadth between the shanks and the posterior fontanelle.  The plates were then articulated and traction was applied with start of a contraction.  Advancement of the fetal head was noted with each traction and a total number of 1 pull was necessary.  The forceps was released upon  of the fetal head and the  delivery was performed after the assistance of maternal expulsive efforts.    The patient delivered a viable male , 8#10oz, apgars of 8 (1 min) and 9 (5 min). There was no nuchal cord. Baby was OP, restituted to LOT. Bilateral shoulders delivered with maternal expulsive forces and the assistance of gentle downward traction. The remainder of the fetus delivered spontaneously.     Upon delivery, the infant was placed on the mothers abdomen and the cord was clamped and cut. The infant was noted to cry spontaneously and was moving all extremities appropriately. There was no evidence for injury. Awaiting nurse resuscitators evaluated the . Arterial and venous cord blood gases and cord blood was collected for analysis. These were promptly sent to the lab. In the immediate post-partum, 30 units of IV pitocin was administered, and the uterus was noted to contract down well with massage and pitocin.     Perineum was inspected at this time and there was noted to be a 3c laceration. Patient was counseled that she would need extensive repair. She was given 50 mcg of fentanyl, with minimal relief, therefore declined repair with the use of local anesthetic and requested to be placed under general anesthesia for the repair. Gentle fundal massage was initiated to promote delivery of the placenta, but this was immediately stopped due to immense patient discomfort. Decision was made to proceed to the OR for placental delivery and repair of 3c laceration under general anesthesia. Please see separate operative report for details of repair.    Dr. Vega was present for the entire procedure.    Aliyah Patterson MD  OB/GYN PGY-2  10/10/2024  6:05 PM    I agree with above, with changes made directly  Adriana MD [place]

## 2024-10-10 NOTE — TELEPHONE ENCOUNTER
"38w3d OB patient,  called in stating she believes she is in labor. States last night she lost some fluid- clear, slow trickle and then lost her mucous plug around 2AM. Did have some more light leakage. States overnight she started with a lot of pelvic pressure and discomfort. States she has been having contractions, 4 in the past hour that are getting pretty severe, unable to walk and talk during these. Reports good fetal movement, no VB. Patient with contraction while on the phone with RN. Audible deep breathing, emotional support given. Patient does note with contractions she is feeling an urge to push, is about 40 mins out from the [facility]. Asked patient if she has someone to drive her- states her  suffered from a stroke recently and should not be driving, healing well but still not cleared to drive. Patient advises she will have him drive her if she needs to. Informed patient I do not recommend that and with her intensity of contractions and distance to the hospital, I would recommend we call 911 for ambulance transport. Patient declined this and states she can drive herself with no issue- does verbalized if she feels it is too intense, she will pull over to the side of the road and call 911 for help. Encouraged patient to leave asap. Will notify L&D and oncall.     [facility] message sent to Dr Vega and [place] Charge RN.     Reason for Disposition   MODERATE-SEVERE abdominal pain    Answer Assessment - Initial Assessment Questions  1. ONSET: \"When did the symptoms begin?\"         Last evening  2. CONTRACTIONS: \"Describe the contractions that you are having.\" (e.g., duration, frequency, regularity, severity)      Overnight a lot of pelvic pressure, contractions - unable to stand with these, 4 in the past hour  3. NELLY: \"What date are you expecting to deliver?\"      10/21/2024  4. PARITY: \"Have you had a baby before?\" If Yes, ask: \"How long did the labor last?\"        5. FETAL MOVEMENT: " "\"Has the baby's movement decreased or changed significantly from normal?\"      Has felt good fetal movement  6. OTHER SYMPTOMS: \"Do you have any other symptoms?\" (e.g., leaking fluid from vagina, vaginal bleeding, fever, hand/facial swelling)      Feels baby pretty low, very intense. Loss of clear fluid last night- slow trickle, then more came out a little later, mucous plug at 2AM.    Protocols used: Pregnancy - Labor-ADULT-[place]    "

## 2024-10-10 NOTE — ASSESSMENT & PLAN NOTE
Patient presented with strong, regular contractions suspicious for labor. SVE 2.5/50/-2 -> 4.5/70/-2, toco with contractions q2min, FHR Cat I  - admit for labor  - IV fluids, clear liquid diet  - labs: CBC, T/S, RPR  - analgesia at maternal request  - management: expectant, anticipate

## 2024-10-10 NOTE — OB LABOR/OXYTOCIN SAFETY PROGRESS
Labor Progress Note - Jolie Garcia 44 y.o. female MRN: [id number]    Unit/Bed#: LD PACU-02 Encounter: [id number]    Contraction Frequency (minutes): 1-3  Contraction Intensity: Strong  Uterine Activity Characteristics: Regular  Cervical Dilation: 8-9        Cervical Effacement: 80  Fetal Station: -2  Baseline Rate (FHR): 135 bpm  Fetal Heart Rate (FHT): 71 BPM  FHR Category: I             Vital Signs:   Vitals:    10/10/24 1845   BP: 113/68   Pulse: 68   Resp: 18   Temp: 98 °F (36.7 °C)   SpO2:      Notes/comments:   Pt reporting increased pressure and urge to push. FHR Category I. [place] with contractions q2min. SVE as above. Will recheck as clinically indicated. Anticipate . Attending physician Dr. Vega aware.     Filed late due to patient care.    Tony Jackson MD 10/10/2024 6:52 PM

## 2024-10-10 NOTE — ANESTHESIA POSTPROCEDURE EVALUATION
Post-Op Assessment Note    CV Status:  Stable  Pain Score: 3    Pain management: adequate    Multimodal analgesia used between 6 hours prior to anesthesia start to PACU discharge    Mental Status:  Alert and awake   Hydration Status:  Stable and euvolemic   PONV Controlled:  None   Airway Patency:  Patent  Two or more mitigation strategies used for obstructive sleep apnea   Post Op Vitals Reviewed: Yes    No anethesia notable event occurred.    Staff: CRNA           Last Filed PACU Vitals:  Vitals Value Taken Time   Temp 98.2    Pulse 80    /57    Resp 16    SpO2 100        Modified Dilan:  No data recorded

## 2024-10-10 NOTE — ASSESSMENT & PLAN NOTE
Laceration repaired in OR under general anesthesia, received 2g cefoxitin x2  - Continue bowel regimen

## 2024-10-10 NOTE — DISCHARGE SUMMARY
Discharge Summary - OB/GYN  Jolie Garcia 44 y.o. female MRN: [id number]  Unit/Bed#: LD PACU-02 Encounter: [id number]    Admission Date: 10/10/2024     Discharge Date: 10/12/2024    Admitting Attending: Adriana Vega MD    Delivering Attending: Dr. Vega    Discharging Attending: Dr. Vega    Principal Diagnosis: Pregnancy at 38w3d    Secondary Diagnosis, Admission:  HSV    Discharge Diagnosis:  Same, delivered  3c laceration    Procedures: spontaneous vaginal delivery, 3c laceration repair    Anesthesia: general    Hospital course:  Ms. Jolie Garcia is a 44 y.o.  at 38wk3d. She presented to labor and delivery for uterine contractions. On presentation, her cervical exam was 4.5/70/-2, which was marked change from her last exam in the office a few days prior. She was admitted for labor. Her pregnancy was also complicated by history of HSV on Valtrex suppression; no lesions were noted on speculum exam. She declined epidural analgesia. She progressed to 8 cm of dilation and was unable to resist bearing down. Her legs were placed into stirrups and amniotomy was performed for clear fluid. She was then found to be 9cm, but was bearing down despite counseling against this. Cervical massage was performed over 2 contractions, after which she was noted to be completely dilated and was instructed to start actively pushing. FHT were noted to drop immediately to the 90s bpm with the initiation of active pushing.      Patient was informed that at this time there was concern for fetal well-being, and recommendation was made to proceed with forceps delivery. She was provided with the alternative of a pLTCS and elected for forceps delivery. Indication for forceps was terminal bradycardia. Patient was complete and +2 station, baby was direct OP. Patient was bryan q1-2 min. Patient was felt to have high likelihood for successful operative vaginal delivery. Patient was informed of risks and benefits of  the procedure, including lack of pain control for same. Patient consented to proceed with forceps delivery despite lack of epidural analgesia due to concern for fetal wellbeing.     The patient delivered a viable male , 8lbs 10oz, apgars of 8 (1 min) and 9 (5 min). Perineum was inspected and there was noted to be a 3c laceration. Patient was counseled that she would need extensive repair. She was given 50 mcg of fentanyl, with minimal relief, therefore declined repair with the use of local anesthetic and requested to be placed under general anesthesia for the repair. Patient was taken to the OR and the repair was performed without complication.    Her post-delivery course was uncomplicated. Mom's blood type is A positive, so RhoGAM was not indicated.    Her postpartum pain was well controlled with oral analgesics. On day of discharge, she was ambulating and able to reasonably perform all ADLs. She was voiding and had appropriate bowel function. She was discharged home on postpartum day #2 without complications. She was instructed to follow up with her OB as an outpatient and was given appropriate warnings to call provider if she develops signs of infection or uncontrolled pain.    Complications: none apparent    Condition at discharge: good     Discharge medications and instructions:   Provisions for Follow-Up Care:  See after visit summary    Disposition: Home    Planned Readmission: No    Andrei Hermosillo  OB GYN PGY1  10/12/24  9:14 PM

## 2024-10-10 NOTE — OP NOTE
OPERATIVE REPORT  PATIENT NAME: Jolie Garcia    :  1980  MRN: [id number]  Pt Location: [facility] L&D OR ROOM 02    SURGERY DATE: 10/10/2024    Surgeons and Role:     * Adriana Vega MD - Primary     * Vitor Virgen MD - Assisting     * Aliyah Patterson MD - Assisting    Preop Diagnosis:  Repair of third degree laceration     Post-Op Diagnosis Codes:     * History of repair of laceration [Z98.890]    Procedure(s) (LRB):  REPAIR LACERATION PERINEAL / VAGINAL (N/A)    Specimen(s):  * No specimens in log *    Surgical QBL: 364 mL    Drains: none    Anesthesia Type: general    Operative Indications:  3rd degree laceration without adequate anesthesia     Operative Findings:  3rd degree laceration (3c) with right sulcal laceration and left vulvar laceration    Complications:   None    Procedure and Technique:  As stated in delivery note, after  delivery, a third degree laceration was noted. Jolie was very uncomfortable, so was given 50 mcg fentanyl. At this point, she could not tolerate fundal pressure for delivery of placenta, declined further intervention without general anesthesia.     Patient was taken to the operating room were a time out was performed to confirm correct patient and correct procedure. General endotracheal anesthesia (GET) was administered and the patient was positioned on the OR table in the dorsal lithotomy position. All pressure points were padded and blankets placed to maintain control of core body temperature.     Operative Technique    Thorough exam was performed, with right sulcal laceration, 3c laceration and small left vulvar laceration noted. Of note, there was a deep defect in the right perirectal space. The left vulvar laceration was repaired in a running locked fashion, with 3-0 vicryl rapide. The right sulcal laceration was repaired, in a running locked fashion, with 3-0 vicryl rapide. The third degree laceration was repaired in the standard fashion, using 2-0  vicryl to approximate the external anal sphincter posteriorly, inferiorly, superiorly, and anteriorly. Subsequently, the perirectal space was closed, with interrupted stitches, using 2-0 vicryl. After this, the perineum was repaired, in the standard fashion, with 3-0 vicryl rapide. All lacerations were noted to be hemostatic.     A straight catheter was introduced into the bladder, which was drained of 200 mL of clear yellow urine.      At the conclusion of the procedure, all needle, sponge, and instrument counts were noted to be correct x2. Patient tolerated the procedure well and was transferred to PACU in stable condition prior to discharge with follow up in 1-2 weeks.        I was present for the entire procedure. and A co-surgeon was required because of skills and techniques relevant to speciality.    Patient Disposition:  PACU  and extubated and stable    SIGNATURE: Adriana Vega MD  DATE: October 10, 2024  TIME: 6:00 PM

## 2024-10-10 NOTE — ASSESSMENT & PLAN NOTE
Patient progressing toward postpartum milestones.  - S/p 3c laceration repaired in OR under general anesthesia, received 2g cefoxitin x2  - Continue bowel regimen  - Continue routine postpartum care  - Pain management with oral analgesics  - Encourage breastfeeding, familial bonding

## 2024-10-11 PROCEDURE — 99024 POSTOP FOLLOW-UP VISIT: CPT | Performed by: OBSTETRICS & GYNECOLOGY

## 2024-10-11 RX ADMIN — ACETAMINOPHEN 1000 MG: 10 INJECTION INTRAVENOUS at 03:12

## 2024-10-11 RX ADMIN — ACETAMINOPHEN 1000 MG: 10 INJECTION INTRAVENOUS at 13:59

## 2024-10-11 RX ADMIN — VALACYCLOVIR 500 MG: 500 TABLET, FILM COATED ORAL at 18:56

## 2024-10-11 RX ADMIN — OXYCODONE HYDROCHLORIDE 5 MG: 5 TABLET ORAL at 09:03

## 2024-10-11 RX ADMIN — DOCUSATE SODIUM 100 MG: 100 CAPSULE, LIQUID FILLED ORAL at 09:03

## 2024-10-11 RX ADMIN — DOCUSATE SODIUM 100 MG: 100 CAPSULE, LIQUID FILLED ORAL at 18:56

## 2024-10-11 RX ADMIN — SENNOSIDES 8.6 MG: 8.6 TABLET, FILM COATED ORAL at 09:03

## 2024-10-11 RX ADMIN — KETOROLAC TROMETHAMINE 30 MG: 30 INJECTION, SOLUTION INTRAMUSCULAR; INTRAVENOUS at 11:30

## 2024-10-11 RX ADMIN — ACETAMINOPHEN 975 MG: 325 TABLET ORAL at 22:23

## 2024-10-11 RX ADMIN — KETOROLAC TROMETHAMINE 30 MG: 30 INJECTION, SOLUTION INTRAMUSCULAR; INTRAVENOUS at 05:22

## 2024-10-11 RX ADMIN — CEFOXITIN SODIUM 2000 MG: 2 INJECTION, SOLUTION INTRAVENOUS at 00:11

## 2024-10-11 RX ADMIN — KETOROLAC TROMETHAMINE 30 MG: 30 INJECTION, SOLUTION INTRAMUSCULAR; INTRAVENOUS at 19:17

## 2024-10-11 RX ADMIN — VALACYCLOVIR 500 MG: 500 TABLET, FILM COATED ORAL at 09:03

## 2024-10-11 NOTE — PLAN OF CARE

## 2024-10-11 NOTE — LACTATION NOTE
This note was copied from a baby's chart.  Follow up Lactation: mom wants to latch baby to the breast.     Ed. On how to est. Milk supply with a mixed feeding plan.     Demonstration and teach back on hand expression. Visible colostrum on the nipple face.       Mom:  Breast: L breast is round, with dark areolas and everted nipple   Nipple Assessment in General: Normal: elongated/eraser, no discoloration and no damage noted.  Mother's Awareness of Feeding Cues                 Recognizes: Yes                  Verbalizes: Yes, with ed. And reassurance  Support System: FOB  History of Breastfeeding: attempted with first child     Latch After Lactation Education/Consult:  Efficiency: L breast football hold; fr 30- min. R breast latched at 4:58              Lips Flanged: Yes              Depth of latch: deep with demonstration and teach back of alignment              Audible Swallow: Yes, demonstration of breast compressions with teach back              Visible Milk: no              Wide Open/ Asymmetrical: Yes, with proper alignment              Suck Swallow Cycle: Breathing: yes, Coordinated: yes  Nipple Assessment after latch: Normal: elongated/eraser, no discoloration and no damage noted.  Latch Problems: non with demonstration and reassurance    Position After Lactation Education/Consult:  Infant's Ergonomics/Body: L breast football hold fr 30- min. R breast latched at 4:58               Body Alignment: Yes               Head Supported: Yes, enc. Snug hold               Close to Mom's body/ Lifted/ Supported: Yes, baby was brought to the breast s2s               Mom's Ergonomics/Body: Yes, lower lumbar support                           Supported: Yes                           Sitting Back: Yes                           Brings Baby to her breast: Yes  Positioning Problems: none with demonstration and teach back. Enc. All feedings to be s2s; ed. On how baby breathes at the breast, signs of satiation, and timing  of feeds    Feeding Plan:     Mix Feeds:   Start every feeding at the breast. Offer both breasts or one breast and use breast compressions to achieve active suckling. Once baby is not actively suckling, bring baby in upright position and offer expressed milk and/or artificial supplementation via alternative feeding method (syringe, finger, paced bottle feeding). Burp frequently between breasts and during paced bottle feeding. Once feed is complete, place baby back on breast for on-nutritive suck. Pump after the feeding session to supplement with expressed milk at next feed.    Milk Supply:   - Allow for non-nutritive suck at the breast to stimulate supply   - Allow for skin to skin during and after each breastfeeding session   - Use massage, heat, and hand expression prior to feedings to assist with deep latch   - Increase pumping sessions and pump after every feeding    Education on positioning and alignment. Mom is encouraged to:     - Bring baby up to the breast (use of pillows to elevate so baby's torso is against mom's breasts)   - Skin to skin for feedings with top hand exposed to show signs of satiation   - Chin deep into breast tissue (make baby look up to the nipple)   - nose aligned to the nipple   -Wait for wide gape, drag chin on the breast so nipple is aimed at the upper, back palate  - Cheek should be touching breast   - Deep, firm hold of baby with ear, shoulder, hip alignment    All babies are born to breastfeed due to upturned nose and flared nostrils. Mom will always see tip of nose. Babies will unlatch when they can't breathe.     Provided demonstration, education and support of deep latch to breast by placing the nipple to the nose, dragging down to chin to achieve a wide latch. Bring baby to the breast, not breast to baby. Move your shoulders down and away from your ears. Look for ear, shoulder, hip alignment. Baby's upper and lower lip should be flanged on the breast.    Assistance was provided  "in bringing the baby to the breast with the nipple aligned to the infant's nose. The baby was encouraged to brennan the nipple to his/her chin to achieve a wide, open mouth.    Demonstrated with teach back breast compressions during a feeding to increase milk transfer and stimulate suckling after a breathing/muscle break.     Reviewed early signs of hunger, including tensing of hands and shoulders - no need to wait for open eyes.  Crying is a late hunger sign.  If baby is crying, soothe baby first and then attempt to latch.  Reviewed normal sucking patterns: transition from stimulation to nutritive to release or non-nutritive. The goal is to see and hear lots of swallowing.  Reviewed normal nursing pattern: infant could latch on one breast up to 30 minutes or until releases on own. Signs of satiation is open hand with fingers that do not grab your finger.  Discussed difference in sensation of non-nutritive v nutritive sucking    - Start feedings on breast that last feeding ended   - allow no more than 3 hours between breast feeding sessions   - time between feedings is counted from the beginning of the first feed to the beginning of the next feeding session    Nurse on demand: when baby gives hunger cues; when your breasts feel full, or at least every 3 hours during the day and every 5 hours at night counting from the beginning of one feeding to the beginning of the next; which ever comes first. When sucking and swallowing slow, gently compress the breast to restart flow. If active suck-swallow does not restart, gently remove the baby and offer the other breast; offering up to \"four\" breasts per feeding.      (Scan QR code for [employer] - positions)   Review [employer] on youtube or scan QR code for [employer] video      Mil[employer]        [employer] - positions    Pumping:   - When pumping, begin in stimulation mode (high cycle, low vacuum) until milk begins to express. Change pump to " expression mode (low cycle, high vacuum). Use hands on pumping techniques to assist with milk transfer. When milk stops expressing, change back to stimulation mode. When milk begins to flow, change to expression mode. You make cycle pump up to three times in a pumping session.

## 2024-10-11 NOTE — LACTATION NOTE
"This note was copied from a baby's chart.  CONSULT - LACTATION  Baby Boy (Jolie) Radha 1 days male MRN: [id number]    [facility] Room / Bed: (N)/(N) Encounter: [id number]    Maternal Information     MOTHER:  Jolie Garcia  Maternal Age: 44 y.o.  OB History: # 1 - Date: [birth year], Sex: Male, Weight: 3175 g (7 lb), GA: 38w0d, Type: Vaginal, Vacuum (Extractor), Apgar1: None, Apgar5: None, Living: Living, Birth Comments: possible abruption based on description rapid labor, heavy bleeding with clotting problems (?DIC) and \"placenta\" problem    # 2 - Date: 10/10/24, Sex: Male, Weight: 3910 g (8 lb 9.9 oz), GA: 38w3d, Type: Vaginal, Forceps, Apgar1: 8, Apgar5: 9, Living: Living, Birth Comments: None   Previouse breast reduction surgery? No    Lactation history:   Has patient previously breast fed:     How long had patient previously breast fed:     Previous breast feeding complications:       Past Surgical History:   Procedure Laterality Date    EXAMINATION UNDER ANESTHESIA  2019    with delievery of her son in [place]-    SD COLPOPERINEORRHAPHY SUTURE INJ VAGINA&/PERINEU N/A 10/10/2024    Procedure: REPAIR LACERATION PERINEAL / VAGINAL;  Surgeon: Adriana Vega MD;  Location: [facility] ;  Service: Obstetrics    TONSILLECTOMY      WISDOM TOOTH EXTRACTION         Birth information:  YOB: 2022   Time of birth: 3:32 PM   Sex: male   Delivery type: Vaginal, Forceps   Birth Weight: 3910 g (8 lb 9.9 oz)   Percent of Weight Change: -2%     Gestational Age: 38w3d   [unfilled]      No assessments: mom has family in the room and baby is in nursery for circ.       Feeding recommendations:   mom has a plan for mixed feeds.   RSB/dC reviewed    Enc. To call lactation for next latch / feeding.    Information on hand expression given. Discussed benefits of knowing how to manually express breast including stimulating milk supply, softening nipple for latch and " evacuating breast in the event of engorgement.    Mom is encouraged to place baby skin to skin for feedings. Skin to skin education provided for baby placement on mother's chest, baby only in diaper, blankets below shoulders on baby's back. Skin to skin is encouraged to continue at home for feedings and between feedings.    Worked on positioning infant up at chest level and starting to feed infant with nose arriving at the nipple. Then, using areolar compression to achieve a deep latch that is comfortable and exchanges optimum amounts of milk.     - Start feedings on breast that last feeding ended   - allow no more than 3 hours between breast feeding sessions   - time between feedings is counted from the beginning of the first feed to the beginning of the next feeding session    Reviewed early signs of hunger, including tensing of hands and shoulders - no need to wait for open eyes.  Crying is a late hunger sign.  If baby is crying, soothe baby first and then attempt to latch.  Reviewed normal sucking patterns: transition from stimulation to nutritive to release or non-nutritive. The goal is to see and hear lots of swallowing.    Reviewed normal nursing pattern: infant could latch on one breast up to 30 minutes or until releases on own. Signs of satiation is open hand with fingers that do not grab your finger.  Discussed difference in sensation of non-nutritive v nutritive sucking    Met with mother. Provided mother with Ready, Set, Baby booklet.    Discussed Skin to Skin contact an benefits to mom and baby.  Talked about the delay of the first bath until baby has adjusted. Spoke about the benefits of rooming in. Feeding on cue and what that means for recognizing infant's hunger. Avoidance of pacifiers for the first month discussed. Talked about exclusive breastfeeding for the first 6 months.    Positioning and latch reviewed as well as showing images of other feeding positions.  Discussed the properties of a good  latch in any position. Reviewed hand/manual expression.  Discussed s/s that baby is getting enough milk and some s/s that breastfeeding dyad may need further help.    Gave information on common concerns, what to expect the first few weeks after delivery, preparing for other caregivers, and how partners can help. Resources for support also provided.    Encouraged parents to call for assistance, questions, and concerns about breastfeeding.  Extension provided.    Provided education on growth spurts, when to introduce bottles; paced bottle feeding, and non-nutritive suck at the breast. Provided education on Signs of satiation. Encouraged to call lactation to observe a latch prior to discharge for reassurance. Encouraged to call baby and me with any questions and closely monitor output.      Christy Hume, MA 10/11/2024 4:35 PM

## 2024-10-11 NOTE — PLAN OF CARE

## 2024-10-11 NOTE — ANESTHESIA POSTPROCEDURE EVALUATION
Post-Op Assessment Note    CV Status:  Stable  Pain Score: 0    Pain management: adequate       Mental Status:  Alert and awake   Hydration Status:  Euvolemic   PONV Controlled:  Controlled   Airway Patency:  Patent     Post Op Vitals Reviewed: Yes    No anethesia notable event occurred.    Staff: Anesthesiologist           Last Filed PACU Vitals:  Vitals Value Taken Time   Temp 98 °F (36.7 °C) 10/10/24 1845   Pulse 70 10/10/24 1915   /63 10/10/24 1915   Resp 16 10/10/24 1915   SpO2 99 % 10/10/24 1915       Modified Dilan:  Activity: 2 (10/10/2024  7:15 PM)  Respiration: 2 (10/10/2024  7:15 PM)  Circulation: 2 (10/10/2024  7:15 PM)  Consciousness: 2 (10/10/2024  7:15 PM)  Oxygen Saturation: 2 (10/10/2024  7:15 PM)  Modified Dilan Score: 10 (10/10/2024  7:15 PM)

## 2024-10-11 NOTE — PROGRESS NOTES
Obstetrics Progress Note  Jolie Garcia 44 y.o. female MRN: [id number]  Unit/Bed#: -01 Encounter: [id number]    Assessment/Plan:    Postpartum Day #1 s/p forceps-assisted vaginal delivery complicated by 3c perineal laceration requiring repair under general anesthesia, currently stable. Baby in room. By issue:    Type 3c perineal laceration during delivery  Assessment & Plan  Laceration repaired in OR under general anesthesia, received 2g cefoxitin x2  - Continue bowel regimen    HSV (herpes simplex virus) anogenital infection  Assessment & Plan  No lesions on speculum prior to delivery  - Continue Valtrex 500mg BID    * FAVD  Assessment & Plan  Patient progressing toward postpartum milestones.  - S/p 3c laceration repaired in OR under general anesthesia, received 2g cefoxitin x2  - Continue bowel regimen  - Continue routine postpartum care  - Pain management with oral analgesics  - Encourage breastfeeding, familial bonding        Subjective/Objective     Subjective:   No acute events overnight. Pain: not well controlled, patient not taking sasha PRN; is amenable to trying this AM. Tolerating PO: yes. Voiding: yes. Flatus: passing. Ambulating: yes. Chest pain: no. Shortness of breath: no. Leg pain: no. Lochia: within normal limits. Baby in room, feeding via breast.    Objective:   Vitals:   Temp:  [98 °F (36.7 °C)-98.5 °F (36.9 °C)] 98.5 °F (36.9 °C)  HR:  [65-78] 68  BP: ()/(57-71) 93/61  Resp:  [16-18] 18  SpO2:  [96 %-99 %] 96 %  O2 Device: None (Room air)       Intake/Output Summary (Last 24 hours) at 10/11/2024 0618  Last data filed at 10/11/2024 0337  Gross per 24 hour   Intake 300 ml   Output 1314 ml   Net -1014 ml       Lab Results   Component Value Date    WBC 15.12 (H) 10/10/2024    HGB 11.7 10/10/2024    HCT 35.3 10/10/2024    MCV 90 10/10/2024     10/10/2024       Physical Exam:   General: alert and oriented x3, in no apparent distress  Cardiovascular: regular rate  Pulmonary: normal  effort  Abdomen: Soft, non-tender, non-distended, no rebound or guarding. Uterine fundus firm and non-tender, at the umbilicus  : exam deferred until attending present  Extremities: Non tender with no edema    Aliyah Patterson MD  OBGYN PGY2  10/11/2024, 6:18 AM

## 2024-10-11 NOTE — ANESTHESIA PREPROCEDURE EVALUATION
Procedure:  REPAIR LACERATION PERINEAL / VAGINAL (Perineum)    Relevant Problems   GYN   (+) 38 weeks gestation of pregnancy   (+) Multigravida of advanced maternal age in third trimester      MUSCULOSKELETAL   (+) Type 3c perineal laceration during delivery        Physical Exam    Airway    Mallampati score: I  TM Distance: >3 FB  Neck ROM: full     Dental   No notable dental hx     Cardiovascular      Pulmonary      Other Findings  post-pubertal.      Anesthesia Plan  ASA Score- 3 Emergent    Anesthesia Type- general with ASA Monitors.         Additional Monitors:     Airway Plan: ETT.    Comment: Patient seen and examined.  History reviewed.  Patient to be done under general anesthesia with ETT and routine monitors.  Risks discussed with the patient. Consent obtained..       Plan Factors-Exercise tolerance (METS): >4 METS.    Chart reviewed.    Patient summary reviewed.                  Induction- rapid sequence induction and intravenous.    Postoperative Plan- Plan for postoperative opioid use. Planned trial extubation    Perioperative Resuscitation Plan - Level 1 - Full Code.       Informed Consent- Anesthetic plan and risks discussed with patient.  I personally reviewed this patient with the CRNA. Discussed and agreed on the Anesthesia Plan with the CRNA..

## 2024-10-12 VITALS
HEART RATE: 91 BPM | OXYGEN SATURATION: 97 % | TEMPERATURE: 97.9 F | DIASTOLIC BLOOD PRESSURE: 75 MMHG | SYSTOLIC BLOOD PRESSURE: 100 MMHG | RESPIRATION RATE: 18 BRPM

## 2024-10-12 LAB
ABO GROUP BLD BPU: NORMAL
ABO GROUP BLD BPU: NORMAL
BPU ID: NORMAL
BPU ID: NORMAL
CROSSMATCH: NORMAL
CROSSMATCH: NORMAL
UNIT DISPENSE STATUS: NORMAL
UNIT DISPENSE STATUS: NORMAL
UNIT PRODUCT CODE: NORMAL
UNIT PRODUCT CODE: NORMAL
UNIT PRODUCT VOLUME: 350 ML
UNIT PRODUCT VOLUME: 350 ML
UNIT RH: NORMAL
UNIT RH: NORMAL

## 2024-10-12 PROCEDURE — NC001 PR NO CHARGE: Performed by: STUDENT IN AN ORGANIZED HEALTH CARE EDUCATION/TRAINING PROGRAM

## 2024-10-12 PROCEDURE — 99024 POSTOP FOLLOW-UP VISIT: CPT | Performed by: STUDENT IN AN ORGANIZED HEALTH CARE EDUCATION/TRAINING PROGRAM

## 2024-10-12 RX ORDER — IBUPROFEN 600 MG/1
600 TABLET, FILM COATED ORAL EVERY 6 HOURS SCHEDULED
Qty: 30 TABLET | Refills: 0 | Status: SHIPPED | OUTPATIENT
Start: 2024-10-12

## 2024-10-12 RX ORDER — DOCUSATE SODIUM 100 MG/1
100 CAPSULE, LIQUID FILLED ORAL 2 TIMES DAILY
Qty: 12 CAPSULE | Refills: 0 | Status: SHIPPED | OUTPATIENT
Start: 2024-10-12

## 2024-10-12 RX ORDER — SENNOSIDES 8.6 MG
8.6 TABLET ORAL DAILY
Qty: 20 TABLET | Refills: 0 | Status: SHIPPED | OUTPATIENT
Start: 2024-10-13

## 2024-10-12 RX ORDER — CALCIUM CARBONATE 500 MG/1
1000 TABLET, CHEWABLE ORAL 3 TIMES DAILY PRN
Start: 2024-10-12

## 2024-10-12 RX ORDER — BENZOCAINE/MENTHOL 6 MG-10 MG
1 LOZENGE MUCOUS MEMBRANE DAILY PRN
Qty: 20 G | Refills: 0 | Status: SHIPPED | OUTPATIENT
Start: 2024-10-12

## 2024-10-12 RX ORDER — ACETAMINOPHEN 325 MG/1
975 TABLET ORAL EVERY 8 HOURS SCHEDULED
Qty: 30 TABLET | Refills: 0 | Status: SHIPPED | OUTPATIENT
Start: 2024-10-12

## 2024-10-12 RX ORDER — POLYETHYLENE GLYCOL 3350 17 G/17G
17 POWDER, FOR SOLUTION ORAL DAILY
Start: 2024-10-13

## 2024-10-12 RX ADMIN — SENNOSIDES 8.6 MG: 8.6 TABLET, FILM COATED ORAL at 08:35

## 2024-10-12 RX ADMIN — VALACYCLOVIR 500 MG: 500 TABLET, FILM COATED ORAL at 08:35

## 2024-10-12 RX ADMIN — IBUPROFEN 600 MG: 600 TABLET, FILM COATED ORAL at 02:39

## 2024-10-12 RX ADMIN — DOCUSATE SODIUM 100 MG: 100 CAPSULE, LIQUID FILLED ORAL at 08:35

## 2024-10-12 RX ADMIN — ACETAMINOPHEN 975 MG: 325 TABLET ORAL at 06:27

## 2024-10-12 RX ADMIN — IBUPROFEN 600 MG: 600 TABLET, FILM COATED ORAL at 08:35

## 2024-10-12 NOTE — PLAN OF CARE

## 2024-10-12 NOTE — PROGRESS NOTES
Progress Note - OB/GYN   Name: Jolie Garcia 44 y.o. female I MRN: [id number]  Unit/Bed#: -01 I Date of Admission: 10/10/2024   Date of Service: 10/12/2024 I Hospital Day: 2     Assessment & Plan  FAVD  Patient progressing toward postpartum milestones.  - S/p 3c laceration repaired in OR under general anesthesia, received 2g cefoxitin x2  - Continue bowel regimen  - Continue routine postpartum care  - Pain management with oral analgesics  - Encourage breastfeeding, familial bonding  HSV (herpes simplex virus) anogenital infection  No lesions on speculum prior to delivery  - Continue Valtrex 500mg BID  Type 3c perineal laceration during delivery  Laceration repaired in OR under general anesthesia, received 2g cefoxitin x2  - Continue bowel regimen      Assessment:  Post partum Day #2 s/p FAVD, stable, baby in the nursery.    Subjective/Objective   Chief Complaint:     Post delivery. Patient is doing well. Lochia WNL. Pain well controlled.     Subjective:     Pain: yes, cramping, improved with meds  Tolerating PO: yes  Voiding: yes  Flatus: yes  Ambulating: yes  Chest pain: no  Shortness of breath: no  Leg pain: no  Lochia: minimal    Objective:     Vitals: BP 96/57   Pulse 66   Temp 97.8 °F (36.6 °C) (Oral)   Resp 18   LMP 01/15/2024   SpO2 98%   Breastfeeding Yes     I/O         10/10 0701  10/11 0700 10/11 0701  10/12 0700 10/12 0701  10/13 0700    I.V. 300      Total Intake 300      Urine 950      Blood 364      Total Output 1314      Net -1014                     Lab Results   Component Value Date    WBC 15.12 (H) 10/10/2024    HGB 11.7 10/10/2024    HCT 35.3 10/10/2024    MCV 90 10/10/2024     10/10/2024       Physical Exam:     Gen: AAOx3, NAD  CV: no acute distress  Lungs: no acute distress  Abd: Soft, non-tender, non-distended, no rebound or guarding  Uterine fundus firm and non-tender, 1 cm below the umbilicus.  Ext: Non tender    Andrei Siddiqui MD  OBGYN  PGY-1  10/12/2024  7:44 AM

## 2024-10-12 NOTE — PLAN OF CARE

## 2024-10-12 NOTE — PLAN OF CARE
Problem: POSTPARTUM  Goal: Experiences normal postpartum course  Description: INTERVENTIONS:  - Monitor maternal vital signs  - Assess uterine involution and lochia  10/12/2024 1340 by Talya Lopez RN  Outcome: Adequate for Discharge  10/12/2024 0933 by Talya Lopez RN  Outcome: Progressing  Goal: Appropriate maternal -  bonding  Description: INTERVENTIONS:  - Identify family support  - Assess for appropriate maternal/infant bonding   -Encourage maternal/infant bonding opportunities  - Referral to  or  as needed  10/12/2024 1340 by Talya Lopez RN  Outcome: Adequate for Discharge  10/12/2024 0933 by Talya Lopez RN  Outcome: Progressing  Goal: Establishment of infant feeding pattern  Description: INTERVENTIONS:  - Assess breast/bottle feeding  - Refer to lactation as needed  10/12/2024 1340 by Talya Lopez RN  Outcome: Adequate for Discharge  10/12/2024 0933 by Talya Lopez RN  Outcome: Progressing  Goal: Incision(s), wounds(s) or drain site(s) healing without S/S of infection  Description: INTERVENTIONS  - Assess and document dressing, incision, wound bed, drain sites and surrounding tissue  - Provide patient and family education  10/12/2024 1340 by Talya Lopez RN  Outcome: Adequate for Discharge  10/12/2024 0933 by Talya Lopez RN  Outcome: Progressing     Problem: PAIN - ADULT  Goal: Verbalizes/displays adequate comfort level or baseline comfort level  Description: Interventions:  - Encourage patient to monitor pain and request assistance  - Assess pain using appropriate pain scale  - Administer analgesics based on type and severity of pain and evaluate response  - Implement non-pharmacological measures as appropriate and evaluate response  - Consider cultural and social influences on pain and pain management  - Notify physician/advanced practitioner if interventions unsuccessful or patient reports new pain  10/12/2024 1340  by Talya Lopez RN  Outcome: Adequate for Discharge  10/12/2024 0933 by Talya Lopez RN  Outcome: Progressing     Problem: INFECTION - ADULT  Goal: Absence or prevention of progression during hospitalization  Description: INTERVENTIONS:  - Assess and monitor for signs and symptoms of infection  - Monitor lab/diagnostic results  - Monitor all insertion sites, i.e. indwelling lines, tubes, and drains  - Monitor endotracheal if appropriate and nasal secretions for changes in amount and color  - [place] appropriate cooling/warming therapies per order  - Administer medications as ordered  - Instruct and encourage patient and family to use good hand hygiene technique  - Identify and instruct in appropriate isolation precautions for identified infection/condition  10/12/2024 1340 by Talya Lopez RN  Outcome: Adequate for Discharge  10/12/2024 0933 by Talya Lopez RN  Outcome: Progressing     Problem: SAFETY ADULT  Goal: Patient will remain free of falls  Description: INTERVENTIONS:  - Educate patient/family on patient safety including physical limitations  - Instruct patient to call for assistance with activity   - Consult OT/PT to assist with strengthening/mobility   - Keep Call bell within reach  - Keep bed low and locked with side rails adjusted as appropriate  - Keep care items and personal belongings within reach  - Initiate and maintain comfort rounds  - Make Fall Risk Sign visible to staff  - Apply yellow socks and bracelet for high fall risk patients  - Consider moving patient to room near nurses station  10/12/2024 1340 by Talya Lopez RN  Outcome: Adequate for Discharge  10/12/2024 0933 by Talya Lopez RN  Outcome: Progressing  Goal: Maintain or return to baseline ADL function  Description: INTERVENTIONS:  -  Assess patient's ability to carry out ADLs; assess patient's baseline for ADL function and identify physical deficits which impact ability to perform ADLs  (bathing, care of mouth/teeth, toileting, grooming, dressing, etc.)  - Assess/evaluate cause of self-care deficits   - Assess range of motion  - Assess patient's mobility; develop plan if impaired  - Assess patient's need for assistive devices and provide as appropriate  - Encourage maximum independence but intervene and supervise when necessary  - Involve family in performance of ADLs  - Assess for home care needs following discharge   - Consider OT consult to assist with ADL evaluation and planning for discharge  - Provide patient education as appropriate  10/12/2024 1340 by Talya Lopez RN  Outcome: Adequate for Discharge  10/12/2024 0933 by Talya Lopez RN  Outcome: Progressing  Goal: Maintains/Returns to pre admission functional level  Description: INTERVENTIONS:  - Perform AM-PAC 6 Click Basic Mobility/ Daily Activity assessment daily.  - Set and communicate daily mobility goal to care team and patient/family/caregiver.   - Collaborate with rehabilitation services on mobility goals if consulted  - Out of bed for toileting  - Record patient progress and toleration of activity level   10/12/2024 1340 by Talya Lopez RN  Outcome: Adequate for Discharge  10/12/2024 0933 by Talya Lopez RN  Outcome: Progressing     Problem: Knowledge Deficit  Goal: Patient/family/caregiver demonstrates understanding of disease process, treatment plan, medications, and discharge instructions  Description: Complete learning assessment and assess knowledge base.  Interventions:  - Provide teaching at level of understanding  - Provide teaching via preferred learning methods  10/12/2024 1340 by Talya Lopez RN  Outcome: Adequate for Discharge  10/12/2024 0933 by Talya Lopez RN  Outcome: Progressing     Problem: DISCHARGE PLANNING  Goal: Discharge to home or other facility with appropriate resources  Description: INTERVENTIONS:  - Identify barriers to discharge w/patient and caregiver  - Arrange  for needed discharge resources and transportation as appropriate  - Identify discharge learning needs (meds, wound care, etc.)  - Arrange for interpretive services to assist at discharge as needed  - Refer to Case Management Department for coordinating discharge planning if the patient needs post-hospital services based on physician/advanced practitioner order or complex needs related to functional status, cognitive ability, or social support system  10/12/2024 1340 by Talya Lopez, RN  Outcome: Adequate for Discharge  10/12/2024 0933 by Talya Lopez, RN  Outcome: Progressing

## 2024-10-14 NOTE — UTILIZATION REVIEW
"NOTIFICATION OF INPATIENT ADMISSION   MATERNITY/DELIVERY AUTHORIZATION REQUEST   SERVICING FACILITY:   [facility]  Parent Child Health - L&D, [place], NICU  [address] [place] [address]  Tax ID: [id number]  NPI: [id number]   ATTENDING PROVIDER:  Attending Name and NPI#: Adriana Vega Md [[id number]]  Address: [address] [place] [address] [place] [address]  Phone: [phone]   ADMISSION INFORMATION:  Place of Service: Inpatient [facility]  Place of Service Code: 21  Inpatient Admission Date/Time: 10/10/24  1:19 PM  Discharge Date/Time: 10/12/2024  2:09 PM  Admitting Diagnosis Code/Description:  38 weeks gestation of pregnancy [Z3A.38]  Abdominal cramping [R10.9]  Encounter for full-term uncomplicated delivery [O80]     Mother: Jolie Garcia 1980 Estimated Date of Delivery: 10/21/24  Delivering clinician: Adriana Vega   OB History          2    Para   2    Term   2       0    AB   0    Living   2         SAB   0    IAB   0    Ectopic   0    Multiple   0    Live Births   2               [place] Name & MRN:   Information for the patient's :  Rob Maria [[id number]]    Delivery Information:  Sex: male  Delivered 10/10/2024 3:32 PM by Vaginal, Forceps; Gestational Age: 38w3d     Measurements:  Weight: 8 lb 9.9 oz (3910 g);  Height: 20.5\"    APGAR 1 minute 5 minutes 10 minutes   Totals: 8 9       UTILIZATION REVIEW CONTACT:  Ashley Spence, Utilization   Network Utilization Review Department  Phone: [phone]  Fax [phone]  Email: [email or web address]  Contact for approvals/pending authorizations, clinical reviews, and discharge.     PHYSICIAN ADVISORY SERVICES:  Medical Necessity Denial & Rczl-zt-Vyua Review  Phone: [phone]  Fax: [phone]  Email: [email or web address]     DISCHARGE SUPPORT TEAM:  For Patients Discharge Needs & Updates  Phone: [phone] opt. 2 Fax: " [phone]  Email: [email or web address]

## 2024-10-16 LAB — PLACENTA IN STORAGE: NORMAL

## 2024-10-17 ENCOUNTER — TELEPHONE (OUTPATIENT)
Dept: OBGYN CLINIC | Facility: CLINIC | Age: 44
End: 2024-10-17

## 2024-10-17 ENCOUNTER — OFFICE VISIT (OUTPATIENT)
Age: 44
End: 2024-10-17
Payer: COMMERCIAL

## 2024-10-17 ENCOUNTER — TELEPHONE (OUTPATIENT)
Age: 44
End: 2024-10-17

## 2024-10-17 VITALS
HEIGHT: 64 IN | SYSTOLIC BLOOD PRESSURE: 112 MMHG | OXYGEN SATURATION: 100 % | TEMPERATURE: 97.9 F | RESPIRATION RATE: 18 BRPM | DIASTOLIC BLOOD PRESSURE: 61 MMHG | WEIGHT: 202 LBS | BODY MASS INDEX: 34.49 KG/M2 | HEART RATE: 72 BPM

## 2024-10-17 DIAGNOSIS — R60.0 PEDAL EDEMA: Primary | ICD-10-CM

## 2024-10-17 PROCEDURE — 99213 OFFICE O/P EST LOW 20 MIN: CPT | Performed by: PHYSICIAN ASSISTANT

## 2024-10-17 PROCEDURE — S9088 SERVICES PROVIDED IN URGENT: HCPCS | Performed by: PHYSICIAN ASSISTANT

## 2024-10-17 NOTE — TELEPHONE ENCOUNTER
----- Message from Jeana CUMMINGS sent at 10/11/2024 10:47 AM EDT -----  Regarding: Postpartum Call Due  delivered

## 2024-10-17 NOTE — PROGRESS NOTES
[facility]        NAME: Jolie Garcia is a 44 y.o. female  : 1980    MRN: [id number]  DATE: 2024  TIME: 11:31 AM    Assessment and Plan   Pedal edema [R60.0]  1. Pedal edema              Patient Instructions     Bilateral pedal edema postpartum 7 days    Elevate your lower extremities every time you are sitting  Elevate your lower extremities when resting  Obtain pressure stockings    Follow-up with your OB/GYN or PCP    Follow up with PCP in 3-5 days.  Proceed to  ER if symptoms worsen.    If tests are performed, our office will contact you with results only if changes need to made to the care plan discussed with you at the visit. [facility].    Chief Complaint     Chief Complaint   Patient presents with    Leg Swelling     Started started 5 days ago, patient complains of swelling of bilateral legs. 1 week post partum.            History of Present Illness       44-year-old female postpartum 7 days, reporting bilateral swelling of the lower extremities in the last 5 days.  Patient denies chest pain, shortness of breath, palpitations, diaphoresis, hemoptysis, dyspnea, tachypnea, cough, wheezing,, fatigue, weakness, jaw or left arm pain.  Dizziness lightheadedness, nausea vomiting, or intermittent claudication        Review of Systems   Review of Systems   Constitutional:  Negative for activity change, appetite change, chills, fatigue and fever.   Respiratory:  Negative for cough, choking and chest tightness.    Skin:  Negative for color change and rash.   Neurological:  Negative for light-headedness and headaches.         Current Medications       Current Outpatient Medications:     acetaminophen (TYLENOL) 325 mg tablet, Take 3 tablets (975 mg total) by mouth every 8 (eight) hours, Disp: 30 tablet, Rfl: 0    docusate sodium (COLACE) 100 mg capsule, Take 1 capsule (100 mg total) by mouth 2 (two) times a day, Disp: 12 capsule, Rfl: 0     ibuprofen (MOTRIN) 600 mg tablet, Take 1 tablet (600 mg total) by mouth every 6 (six) hours, Disp: 30 tablet, Rfl: 0    senna (SENOKOT) 8.6 mg, Take 1 tablet (8.6 mg total) by mouth daily, Disp: 20 tablet, Rfl: 0    benzocaine-menthol-lanolin-aloe (DERMOPLAST) 20-0.5 % topical spray, Apply 1 Application topically every 6 (six) hours as needed for mild pain (Patient not taking: Reported on 10/17/2024), Disp: , Rfl:     calcium carbonate (TUMS) 500 mg chewable tablet, Chew 2 tablets (1,000 mg total) 3 (three) times a day as needed for indigestion or heartburn (Patient not taking: Reported on 10/17/2024), Disp: , Rfl:     ferrous sulfate 325 (65 FE) MG EC tablet, Take 325 mg by mouth 3 (three) times a day with meals (Patient not taking: Reported on 10/17/2024), Disp: , Rfl:     hydrocortisone 1 % cream, Apply 1 Application topically daily as needed for irritation (Patient not taking: Reported on 10/17/2024), Disp: 20 g, Rfl: 0    polyethylene glycol (MIRALAX) 17 g packet, Take 17 g by mouth daily (Patient not taking: Reported on 10/17/2024), Disp: , Rfl:     Prenatal MV-Min-Fe Fum-FA-DHA (PRENATAL 1 PO), Take by mouth (Patient not taking: Reported on 10/17/2024), Disp: , Rfl:     witch hazel-glycerin (TUCKS) topical pad, Apply 1 Pad topically every 4 (four) hours as needed for irritation (Patient not taking: Reported on 10/17/2024), Disp: , Rfl:     Current Allergies     Allergies as of 10/17/2024 - Reviewed 10/17/2024   Allergen Reaction Noted    Penicillins Rash 10/28/2019            The following portions of the patient's history were reviewed and updated as appropriate: allergies, current medications, past family history, past medical history, past social history, past surgical history and problem list.     Past Medical History:   Diagnosis Date    Abnormal Pap smear of cervix 2013    HPV    Endometriosis     Genital herpes     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, NOT on Pap testing     "Varicella        Past Surgical History:   Procedure Laterality Date    EXAMINATION UNDER ANESTHESIA  2019    with delievery of her son in [place]-    CA COLPOPERINEORRHAPHY SUTURE INJ VAGINA&/PERINEU N/A 10/10/2024    Procedure: REPAIR LACERATION PERINEAL / VAGINAL;  Surgeon: Adriana Vega MD;  Location: [facility] ;  Service: Obstetrics    TONSILLECTOMY      WISDOM TOOTH EXTRACTION         Family History   Problem Relation Age of Onset    Thyroid disease Mother         hashimotos    Endometriosis Mother         Required hysterectomy    No Known Problems Father     Endometriosis Sister         required hysterectomy    No Known Problems Brother     No Known Problems Son     Stroke Maternal Grandmother     Dementia Maternal Grandmother     Diabetes Maternal Grandfather     Heart attack Maternal Grandfather     Heart failure Maternal Grandfather     Brain cancer Paternal Grandmother     Lung cancer Paternal Grandfather          Medications have been verified.        Objective   /61   Pulse 72   Temp 97.9 °F (36.6 °C)   Resp 18   Ht 5' 4\" (1.626 m)   Wt 91.6 kg (202 lb)   LMP 01/15/2024   SpO2 100%   BMI 34.67 kg/m²        Physical Exam     Physical Exam  Vitals and nursing note reviewed.   Constitutional:       General: She is not in acute distress.     Appearance: Normal appearance. She is obese. She is not ill-appearing, toxic-appearing or diaphoretic.   Eyes:      General: No scleral icterus.     Extraocular Movements: Extraocular movements intact.      Conjunctiva/sclera: Conjunctivae normal.      Pupils: Pupils are equal, round, and reactive to light.   Cardiovascular:      Rate and Rhythm: Normal rate and regular rhythm.      Pulses: Normal pulses.      Heart sounds: Normal heart sounds. No murmur heard.  Pulmonary:      Effort: Pulmonary effort is normal. No respiratory distress.      Breath sounds: Normal breath sounds. No wheezing.   Musculoskeletal:         General: Normal range of motion.      " Cervical back: Normal range of motion and neck supple.      Right lower leg: Edema present.      Left lower leg: Edema present.   Skin:     General: Skin is warm and dry.      Findings: No bruising, erythema or lesion.   Neurological:      Mental Status: She is alert and oriented to person, place, and time.      Coordination: Coordination normal.      Gait: Gait normal.   Psychiatric:         Mood and Affect: Mood normal.         Behavior: Behavior normal.

## 2024-10-17 NOTE — TELEPHONE ENCOUNTER
Date of Delivery: 10/10  Delivering Provider: MICHELLE  Mode: Forceps   Delivery Notes/Complications: 3c repair in OR  Breastfeeding/Formula/Both? breast  postpartum visit scheduled? Not yet- on appointment list.

## 2024-10-17 NOTE — TELEPHONE ENCOUNTER
----- Message from Adriana Vega MD sent at 10/15/2024  2:00 PM EDT -----  Please call Jolie to schedule her postpartum with me. ty

## 2024-11-04 PROBLEM — O40.3XX0 POLYHYDRAMNIOS AFFECTING PREGNANCY IN THIRD TRIMESTER: Status: RESOLVED | Noted: 2024-09-26 | Resolved: 2024-11-04

## 2024-11-04 PROBLEM — O09.293 PRIOR PREGNANCY WITH PLACENTA ABRUPTION IN THIRD TRIMESTER, ANTEPARTUM: Status: RESOLVED | Noted: 2024-04-12 | Resolved: 2024-11-04

## 2024-11-04 PROBLEM — Z3A.38 38 WEEKS GESTATION OF PREGNANCY: Chronic | Status: RESOLVED | Noted: 2024-03-22 | Resolved: 2024-11-04

## 2024-11-04 PROBLEM — Z91.89 AT RISK FOR ANEUPLOIDY: Status: RESOLVED | Noted: 2022-04-22 | Resolved: 2024-11-04

## 2024-11-04 PROBLEM — O09.293 HISTORY OF PRIOR PREGNANCY WITH SHORT CERVIX, CURRENTLY PREGNANT IN THIRD TRIMESTER: Status: RESOLVED | Noted: 2024-04-12 | Resolved: 2024-11-04

## 2024-11-05 ENCOUNTER — POSTPARTUM VISIT (OUTPATIENT)
Age: 44
End: 2024-11-05
Payer: COMMERCIAL

## 2024-11-05 VITALS — SYSTOLIC BLOOD PRESSURE: 114 MMHG | BODY MASS INDEX: 32.61 KG/M2 | WEIGHT: 190 LBS | DIASTOLIC BLOOD PRESSURE: 72 MMHG

## 2024-11-05 RX ORDER — ESTRADIOL 0.1 MG/G
2 CREAM VAGINAL DAILY
Qty: 42 G | Refills: 0 | Status: SHIPPED | OUTPATIENT
Start: 2024-11-05

## 2024-11-05 NOTE — PROGRESS NOTES
Jolie Garcia  1980    S:  44 y.o. female here for postpartum visit.  She is s/p  FAVD c/w 3c laceration  on 10/10/24.       Gender: male   Apgars: 8/9  Weight: 8#9.9 oz  Her lochia has resolved.    She is Breastfeeding and Bottle feeding, though her supply is so minimal that she will likely stop soon  She is eating normally, denies constipation, diarrhea. She does note intermittent urinary incontinence  She denies postpartum blues/depression.  Her EPDS is 7.      Last Pap: NILM/HPV neg 4/2024    We discussed contraceptive options and she defers at this time, as her partner is unable. Will use condoms if able to have intercourse.     O:   /72 (BP Location: Left arm, Patient Position: Sitting, Cuff Size: Standard)   Wt 86.2 kg (190 lb)   LMP 01/15/2024   BMI 32.61 kg/m²   She appears well and in no distress  Breasts are soft, nontender, without erythema  Abdomen is soft and nontender  External genitals are normal, laceration healing well   Vagina is normal, severely atrophic    A/P:  Postpartum visit.   She will return in 3-4 months for her yearly exam, sooner PRN.   -Family planning reviewed. Strongly recommended at least 1 year interpregnancy interval  -Strongly encouraged pelvic floor PT, but she declines at this time

## 2025-02-25 NOTE — ASSESSMENT & PLAN NOTE
Here for 1st OB appointment accompanied by no one. Feels well but uncomfortable d/t recent weight gain (states 40 lbs but not sure if her scale is accurate).  Discussed mindful eating and walking/light exercise. Pap/GC/CT done today.  She has not done her labs yet but will do so in 4 days when she will get her MSAFP and her 1 hour glucose test done.  A TSH was also ordered to her labs. Reports hx of abnml Paps. Denies LOF/CTX/VB. No pregnancy concerns expressed by patient. All questions answered. Oriented to our practice. Blue folder given and reviewed at OB Intake. She was advised to stop taking oral iodine for the rest of the pregnancy, states she has only taken about 5 doses this pregnancy. Will discuss further with MFM.     removed.

## (undated) DEVICE — SUT VICRYL 2-0 CT-1 27 IN J259H

## (undated) DEVICE — SUT VICRYL 2-0 SH 27 IN UNDYED J417H

## (undated) DEVICE — Device